# Patient Record
Sex: MALE | Race: WHITE
[De-identification: names, ages, dates, MRNs, and addresses within clinical notes are randomized per-mention and may not be internally consistent; named-entity substitution may affect disease eponyms.]

---

## 2016-12-29 NOTE — HP
Date/Time of Note


Date/Time of Note


DATE: 12/29/16 


TIME: 23:01





Assessment/Plan


VTE Prophylaxis


VTE Prophylaxis Intervention:  SCD's





Lines/Catheters


IV Catheter Type (from Nrs):  Saline Lock





Assessment/Plan


Assessment/Plan


80 yo M with 


1. Severe dysphagia 2/2 #2


2. 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


3. Inflammation and  ulceration of oral mucosa likely associated with radiation 

with likely candida superinfection


4. 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 

radiation


5. Pancytopenia with severe leucopenia likely 2/2 Chemo


6. Diffuse chronic skin rash associated with chronic itching  r/o psoriasis


7. Prev alcohol abuse


8. Partially calcified mass R jugular foramen, ?mets





PLAN:


admit med surg / optimize with IVF / nystatin swish / abx mouthwash / pain 

control / antiemetics  and frequent suctioning


GI consult for possible endoscopy + G tube placement. Patient is ok with G-tube


Neutropenic precautions / Oncology consult for pancytopenia/ patient will 

likely benefit from Neupogen therapy


Obtain records from Outpt Rad oncologist Dr Bryson re: cancer staging and 

prognosis


antipruritic / steroid cream PRN for skin itching if patient requests


CXR to eval for possible aspiration pneumonia / pneumonitis


Supportive care


Prophylaxis: IV PPI / SCDs.





Further evaluation and treatment will be based on clinical course


Full discussion with care team done. All questions Answered


Please also see orders.


Total time spent on this evaluation >35mins





HPI/ROS


Admit Date/Time


Admit Date/Time


Dec 29, 2016 at 20:57





Hx of Present Illness


PRESENTING COMPLAINT: difficulty swallowing


HISTORY OF PRESENTING COMPLAINT: 80 yo M who is currently undergoing radiation 

therapy for a parotid malignant tumor. he is s/p 3-5 cycles of chemo and is 

getting radiation every couple of weeks with Dr Bryson in [facility]. He has been 

having worsening dysphagia over the last couple of months and now it's so bad, 

he cannot even tolerate liquids without coughing. He was advised to go to the 

ER for evaluation by his doctor's office and he is being admitted for severe 

dysphagia and odynophagia for GI eval and resucitation. He was also found to be 

almost completely neutropenic and is hence a very high infection risk and will 

need in-house oncology review as well.





ROS


Constitutional:  No diaphoresis, No febrile, No nausea


ENT:  dysphagia, sore throat


Respiratory:  cough, 


   No shortness of breath, No wheezing


Cardiovascular:  No chest pain, No orthopenea, No palpitations


Gastrointestinal:  No pain, No vomiting


Genitourinary:  no complaints


Skin:  erythema, pruritis, skin lesions





PMH/Family/Social


Past Medical History


* Malignant parotid tumor


* Dyslipidemia


* BPH





Family History


Significant Family History:  no pertinent family hx





Social History


* shares an apartment with friends


Alcohol Use:  heavy (used to drink a beer daily)


Smoking Status:  Never smoker


Drug Use:  none





Exam/Review of Systems


Vital Signs


Vitals





 Vital Signs








  Date Time  Temp Pulse Resp B/P Pulse Ox O2 Delivery O2 Flow Rate FiO2


 


12/29/16 21:18 97.6 75 18 104/57 99 Room Air  











Exam


Constitutional:  alert, oriented, 


   No distress


Psych:  anxiety


Head:  No atraumatic, No normocephalic (see below)


Eyes:  PERRL, nl conjunctiva


ENMT:  No mucosa pink and moist, No nl lips & teeth (patient has very dry, 

cracked and peeling lips with hisoral mucosa ulcerated, inflammed as well as 

with whitish coating diffusely extending topost pharynx)


Neck:  supple


Respiratory:  diminished breath sounds, other (loud crackles and congested 

cough suggestive of aspiration)


Cardiovascular:  regular rate and rhythm, 


   No murmurs/extra sounds


Gastrointestinal:  bowel sounds, non-tender, soft


Genitourinary - Male:  other (deffered)


Musculoskeletal:  range of motion (grossly normal)


Extremities:  No edema


Skin:  rash or lesions (generalized occasionally erythematous and scaly macular 

rash with evidence of itching / picking )


Lymph:  enlarged (in neck bilaterally), 


   No nl lymph nodes





Labs


Result Diagram:  


12/29/16 1625                                                                  

              12/29/16 1625








Medications


Medications





 Current Medications


Sodium Chloride (NS) 1,000 ml @  80 mls/hr [id number] IV ;  Start 12/29/16 at 20:55

;  Stop 12/30/16 at 09:24


Pantoprazole 40 mg 40 mg DAILY@06 IV ;  Start 12/30/16 at 06:00;  Status UNV


Dextrose/Sodium Chloride (D5-1/2ns) 1,000 ml @  100 mls/hr Q10H IV ;  Start 12/ 29/16 at 23:00;  Status UNV


Nystatin (Nystatin Susp) 5 ml QID PO ;  Start 12/29/16 at 23:00;  Status UNV


Chlorhexidine Gluconate (Peridex) 15 ml Q12 MT ;  Start 12/30/16 at 09:00;  

Status UNV





Procedures


Procedures





 Laboratory Tests








Test


  12/29/16


16:25


 


Anion Gap 16 


 


Basophils # 10^3/ul 


 


Basophils % % 


 


Blood Morphology Comment  


 


Blood Urea Nitrogen 17mg/dl 


 


Calcium Level 9.1mg/dl 


 


Carbon Dioxide Level 29mmol/L 


 


Chloride Level 97mmol/L 


 


Creatinine 0.70mg/dl 


 


Eosinophils # 10^3/ul 


 


Eosinophils % % 


 


Glucose Level 151mg/dl 


 


Hematocrit 31.9% 


 


Hemoglobin 10.6g/dl 


 


Lymphocytes # 0.210^3/ul 


 


Lymphocytes % 54.0% 


 


Mean Corpuscular Hemoglobin 28.6pg 


 


Mean Corpuscular Hemoglobin


Concent 33.2g/dl 


 


 


Mean Corpuscular Volume 86.3fl 


 


Mean Platelet Volume 8.4fl 


 


Monocytes # 0.110^3/ul 


 


Monocytes % 36.0% 


 


Neutrophils # 0.010^3/ul 


 


Neutrophils % 10.0% 


 


Platelet Count 18736^3/UL 


 


Platelet Estimate


  PLT APPEAR


DECREASED


 


Potassium Level 4.1mmol/L 


 


Red Blood Count 3.7010^6/ul 


 


Red Cell Distribution Width 23.0% 


 


Sodium Level 138mmol/L 


 


White Blood Count 0.410^3/ul 








________________________________________________________________________________

____________________________________________


PROCEDURE:   CT soft tissue neck.  12/19/16


 


CLINICAL INDICATION:   Pain status post radiation to the neck and difficulty 

swallowing 


 


TECHNIQUE:   The study was performed utilizing a [employer] 64-slice multidetector CT 

scanner. Direct thin section helically acquired axial sections were obtained 

through the neck without with the use of 80 cc of a 300.  Coronal and sagittal 

reformations were obtained. The images were reviewed on a PACS workstation. The 

total CTDIvol is 9.57 mGy and the DLP is 227.26 mGy-cm.


 


COMPARISON:   None available 


 


FINDINGS:


 


The visualized posterior fossa is remarkable for the appearance of a partially 

calcified mass in the region of the right jugular fossa which measures 

approximately 1.7 cm transverse by 8 mm in AP dimensions.  Additional imaging 

with contrast MRI brain is suggested.  The remainder of the posterior fossa and 

skull base are normal.


 


The visualized nasopharynx, oropharynx and oral cavity are remarkable for edema 

within the pharyngeal mucosal spaces of the oropharynx. Edema is present of the 

visualized epiglottis.  This is compatible with radiation changes.  The 

intrinsic muscles of the tongue and the pre-epiglottic space and vallecula are 

normal and symmetric. The bilateral sublingual glands, submandibular glands and 

the right parotid gland are normal.  


 


The presence of a soft tissue mass noted involving the left deep parotid gland 

as well as in the sternocleidomastoid muscle and the interposing fat. Recommend 

correlation with the patient's primary pathology which is not available to me 

at this time.  Matted pathologic lymphadenopathy versus contiguous spread of 

neoplasm or the diagnostic considerations.  This mass measures approximately 

3.3 cm AP by 2.9 cm in transverse dimensions by 5.7 cm in superior inferior 

dimensions.  Mild extrinsic deformity is noted on the left jugular vein.  

Internal and external carotid arteries.  Thrombus or partial stenosis of the 

distal right internal jugular vein is noted.


 


The remainder of the supra glottis, glottis subglottic neck is normal.  The 

bilateral thyroid lobes and lung apices are clear. The visualized cervical 

esophagus appears normal.


 


Other than the above described left deep parotid and sternocleidomastoid mass 

no other evidence for pathologic lymphadenopathy is present.


 


IMPRESSION:


1.  No evidence for airway or esophageal obstruction. Recommend additional 

imaging to further evaluate dysphasia such as CT chest with contrast or barium 

swallow.


2.  Left deep parotid and sternocleidomastoid mass measuring approximately 3.3 

cm AP by 2.9 cm transverse by 5.7 cm in superior inferior dimensions.  

Differential to include contiguous spread of neoplasm from the left deep 

parotid lobe or pathologic lymphadenopathy.


3.  Right jugular foramen partially calcified mass measuring 8 mm AP by 1.7 cm 

in transverse dimensions.  Recommend additional imaging to include posterior 

fossa /IAC MRI brain MRI with contrast.


4.  Chronic paranasal sinus disease.


5.  Radiation changes in the oral pharyngeal mucosa.











TIM MACK. Dec 29, 2016 23:16

## 2016-12-29 NOTE — RADRPT
PROCEDURE:   CT soft tissue neck. 

 

CLINICAL INDICATION:   Pain status post radiation to the neck and difficulty swallowing 

 

TECHNIQUE:   The study was performed utilizing a GE 64-slice multidetector CT scanner. Direct thin s
ection helically acquired axial sections were obtained through the neck without with the use of 80 c
c of a 300.  Coronal and sagittal reformations were obtained. The images were reviewed on a PACS wor
kstation. The total CTDIvol is 9.57 mGy and the DLP is 227.26 mGy-cm.

 

COMPARISON:   None available 

 

FINDINGS:

 

The visualized posterior fossa is remarkable for the appearance of a partially calcified mass in the
 region of the right jugular fossa which measures approximately 1.7 cm transverse by 8 mm in AP dime
nsions.  Additional imaging with contrast MRI brain is suggested.  The remainder of the posterior fo
ssa and skull base are normal.

 

The visualized nasopharynx, oropharynx and oral cavity are remarkable for edema within the pharyngea
l mucosal spaces of the oropharynx. Edema is present of the visualized epiglottis.  This is compatib
le with radiation changes.  The intrinsic muscles of the tongue and the pre-epiglottic space and chary
lecula are normal and symmetric. The bilateral sublingual glands, submandibular glands and the right
 parotid gland are normal.  

 

The presence of a soft tissue mass noted involving the left deep parotid gland as well as in the javon
rnocleidomastoid muscle and the interposing fat. Recommend correlation with the patient's primary pa
thology which is not available to me at this time.  Matted pathologic lymphadenopathy versus contigu
ous spread of neoplasm or the diagnostic considerations.  This mass measures approximately 3.3 cm AP
 by 2.9 cm in transverse dimensions by 5.7 cm in superior inferior dimensions.  Mild extrinsic defor
mity is noted on the left jugular vein.  Internal and external carotid arteries.  Thrombus or partia
l stenosis of the distal right internal jugular vein is noted.

 

The remainder of the supra glottis, glottis subglottic neck is normal.  The bilateral thyroid lobes 
and lung apices are clear. The visualized cervical esophagus appears normal.

 

Other than the above described left deep parotid and sternocleidomastoid mass no other evidence for 
pathologic lymphadenopathy is present.

 

IMPRESSION:

1.  No evidence for airway or esophageal obstruction. Recommend additional imaging to further evalua
te dysphasia such as CT chest with contrast or barium swallow.

2.  Left deep parotid and sternocleidomastoid mass measuring approximately 3.3 cm AP by 2.9 cm trans
verse by 5.7 cm in superior inferior dimensions.  Differential to include contiguous spread of neopl
asm from the left deep parotid lobe or pathologic lymphadenopathy.

3.  Right jugular foramen partially calcified mass measuring 8 mm AP by 1.7 cm in transverse dimensi
ons.  Recommend additional imaging to include posterior fossa /IAC MRI brain MRI with contrast.

4.  Chronic paranasal sinus disease.

5.  Radiation changes in the oral pharyngeal mucosa.

 

A call report was made to LEXI MEI at 12/29/2016 6:12:44 PM following the completion of t
he examination by the undersigned.

 

 

 

RPTAT: HDC

_____________________________________________ 

.Alaina Greenfield MD, MD           Date    Time 

Electronically viewed and signed by .Alaina Greenfield MD, MD on 12/29/2016 18:19 

 

D:  12/29/2016 18:19  T:  12/29/2016 18:19

.C/

## 2016-12-29 NOTE — ERA
ER Documentation


Chief Complaint


Date/Time


DATE: 12/29/16 


TIME: 21:03


Chief Complaint


unable to swallow x 3 days,neck pain,pt has neck CA





HPI


This is 73 yo male with a history of neck cancer.  The patient is unsure what 

kind of cancer he has.  He is suspecting it was just parotid gland.  The 

patient says he underwent extensive radiation treatments at a cancer clinic but 

does not know the name of the clinic over the past couple of months.  The 

patient is homeless and says that the past couple days he is completely unable 

to tolerate liquids or solids.  He says he tries to drink water he will spit 

right back up.  The patient says he is urinating but less.  Denies any cough 

fever chest pain abdominal pain nausea vomiting or diarrhea





ROS


All systems reviewed and are negative except as per history of present illness.





Medications


Home Meds


Reported Medications


Atorvastatin Calcium* (Atorvastatin Calcium*) 20 Mg Tablet, 20 MG PO DAILY, #30 

TAB


   12/29/16


Tamsulosin Hcl* (Tamsulosin Hcl*) 0.4 Mg Cap.er.24h, 0.4 MG PO DAILY, CAP


   12/29/16





Allergies


Allergies:  


Coded Allergies:  


     No Known Allergy (Unverified , 12/29/16)





PMhx/Soc


Medical and Surgical Hx:  pt denies Medical Hx, pt denies Surgical Hx


Hx Alcohol Use:  No


Hx Tobacco Use:  No


Smoking Status:  Unknown if ever smoked





FmHx


Family History:  No coronary disease





Physical Exam


Vitals





Vital Signs








  Date Time  Temp Pulse Resp B/P Pulse Ox O2 Delivery O2 Flow Rate FiO2


 


12/29/16 15:23 98.9 108 18 122/67 98   








Physical Exam


Const:      Well-developed, well-nourished


 Head:        Atraumatic, normocephalic


 Eyes:       Normal Conjunctiva, PERRLA, EOMI, normal sclera, no nystagmus


 ENT:         Normal External Ears, Nose and Mouth, moist mucus membranes, 

there is some mild diffuse thrush in the mouth with some erythema to the palate 

and tonsillar pillars no exudate.


 Neck:        Full range of motion.  No meningismus, no lymphadenopathy.


 Resp:         Clear to auscultation bilaterally, no wheezing, rhonchi, rales


 Cardio:       Regular rate and rhythm, no murmurs, S1 S2 present


 Abd:         Soft, non tender x 4, non distended. Normal bowel sounds, no 

guarding or rebound, no pulsitile abdominal masses or bruits


 Skin:         No petechiae or rashes, no ecchymosis , no maculopapular rash


 Back:        No midline or flank tenderness


 Ext:          No cyanosis, or edema, FROM x 4, normal inspection, 

neurovascularly intact x 4


 Neur:        Awake and alert, STR 5/5 x 4, sensation intact x 4, no focal 

findings, cerebellum intact


 Psych:        Normal Mood and Affect


Result Diagram:  


12/29/16 1625                                                                  

              12/29/16 1625





Results 24 hrs





 Laboratory Tests








Test


  12/29/16


16:25


 


Anion Gap 16 


 


Basophils # 10^3/ul 


 


Basophils % % 


 


Blood Morphology Comment  


 


Blood Urea Nitrogen 17mg/dl 


 


Calcium Level 9.1mg/dl 


 


Carbon Dioxide Level 29mmol/L 


 


Chloride Level 97mmol/L 


 


Creatinine 0.70mg/dl 


 


Eosinophils # 10^3/ul 


 


Eosinophils % % 


 


Glucose Level 151mg/dl 


 


Hematocrit 31.9% 


 


Hemoglobin 10.6g/dl 


 


Lymphocytes # 0.210^3/ul 


 


Lymphocytes % 54.0% 


 


Mean Corpuscular Hemoglobin 28.6pg 


 


Mean Corpuscular Hemoglobin


Concent 33.2g/dl 


 


 


Mean Corpuscular Volume 86.3fl 


 


Mean Platelet Volume 8.4fl 


 


Monocytes # 0.110^3/ul 


 


Monocytes % 36.0% 


 


Neutrophils # 0.010^3/ul 


 


Neutrophils % 10.0% 


 


Platelet Count 14032^3/UL 


 


Platelet Estimate


  PLT APPEAR


DECREASED


 


Potassium Level 4.1mmol/L 


 


Red Blood Count 3.7010^6/ul 


 


Red Cell Distribution Width 23.0% 


 


Sodium Level 138mmol/L 


 


White Blood Count 0.410^3/ul 








 Current Medications








 Medications


  (Trade)  Dose


 Ordered  Sig/Rosey


 Route


 PRN Reason  Start Time


 Stop Time Status Last Admin


Dose Admin


 


 Sodium Chloride


  (NS)  1,000 ml @ 


 1,000 mls/hr  Q1H STAT


 IV


   12/29/16 16:14


 12/29/16 17:13 DC 12/29/16 16:14


 


 


 IV Flush 10 ml  10 ml  STK-MED ONCE


 .ROUTE


   12/29/16 17:17


 12/29/16 17:18 DC 12/29/16 17:26


 


 


 Sodium Chloride


  (NS)  100 ml @ ud  STK-MED ONCE


 .ROUTE


   12/29/16 17:17


 12/29/16 17:18 DC 12/29/16 17:26


 


 


 Iohexol 150 ml  150 ml  STK-MED ONCE


 .ROUTE


   12/29/16 17:17


 12/29/16 17:18 DC 12/29/16 17:26


 


 


 Sodium Chloride


  (NS)  1,000 ml @ 


 80 mls/hr  [id number]


 IV


   12/29/16 20:55


 12/30/16 09:24   


 


 


 Ondansetron HCl


  (Zofran Inj)  4 mg  BRIDGE ORDER PRN


 IV


 NAUSEA AND/OR VOMITING  12/29/16 21:00


 12/30/16 20:59   


 


 


 Acetaminophen


  (Tylenol Tab)  650 mg  ER BRIDGE PRN


 PO


 MILD PAIN/FEVER  12/29/16 21:00


 12/30/16 20:59   


 











Procedures/MDM


PROCEDURE:   CT soft tissue neck. 


 


CLINICAL INDICATION:   Pain status post radiation to the neck and difficulty 

swallowing 


 


TECHNIQUE:   The study was performed utilizing a [employer] 64-slice multidetector CT 

scanner. Direct thin section helically acquired axial sections were obtained 

through the neck without with the use of 80 cc of a 300.  Coronal and sagittal 

reformations were obtained. The images were reviewed on a PACS workstation. The 

total CTDIvol is 9.57 mGy and the DLP is 227.26 mGy-cm.


 


COMPARISON:   None available 


 


FINDINGS:


 


The visualized posterior fossa is remarkable for the appearance of a partially 

calcified mass in the region of the right jugular fossa which measures 

approximately 1.7 cm transverse by 8 mm in AP dimensions.  Additional imaging 

with contrast MRI brain is suggested.  The remainder of the posterior fossa and 

skull base are normal.


 


The visualized nasopharynx, oropharynx and oral cavity are remarkable for edema 

within the pharyngeal mucosal spaces of the oropharynx. Edema is present of the 

visualized epiglottis.  This is compatible with radiation changes.  The 

intrinsic muscles of the tongue and the pre-epiglottic space and vallecula are 

normal and symmetric. The bilateral sublingual glands, submandibular glands and 

the right parotid gland are normal.  


 


The presence of a soft tissue mass noted involving the left deep parotid gland 

as well as in the sternocleidomastoid muscle and the interposing fat. Recommend 

correlation with the patient's primary pathology which is not available to me 

at this time.  Matted pathologic lymphadenopathy versus contiguous spread of 

neoplasm or the diagnostic considerations.  This mass measures approximately 

3.3 cm AP by 2.9 cm in transverse dimensions by 5.7 cm in superior inferior 

dimensions.  Mild extrinsic deformity is noted on the left jugular vein.  

Internal and external carotid arteries.  Thrombus or partial stenosis of the 

distal right internal jugular vein is noted.


 


The remainder of the supra glottis, glottis subglottic neck is normal.  The 

bilateral thyroid lobes and lung apices are clear. The visualized cervical 

esophagus appears normal.


 


Other than the above described left deep parotid and sternocleidomastoid mass 

no other evidence for pathologic lymphadenopathy is present.


 


IMPRESSION:


1.  No evidence for airway or esophageal obstruction. Recommend additional 

imaging to further evaluate dysphasia such as CT chest with contrast or barium 

swallow.


2.  Left deep parotid and sternocleidomastoid mass measuring approximately 3.3 

cm AP by 2.9 cm transverse by 5.7 cm in superior inferior dimensions.  

Differential to include contiguous spread of neoplasm from the left deep 

parotid lobe or pathologic lymphadenopathy.


3.  Right jugular foramen partially calcified mass measuring 8 mm AP by 1.7 cm 

in transverse dimensions.  Recommend additional imaging to include posterior 

fossa /IAC MRI brain MRI with contrast.


4.  Chronic paranasal sinus disease.


5.  Radiation changes in the oral pharyngeal mucosa.


 


A call report was made to LEXI MEI at 12/29/2016 6:12:44 PM 

following the completion of the examination by the undersigned.


 


 


 


RPTAT: HDC


_____________________________________________ 


.Alaina Greenfield MD, MD           Date    Time 


Electronically viewed and signed by .Alaina Greenfield MD, MD on 12/29/2016 18:

19 


 


D:  12/29/2016 18:19  T:  12/29/2016 18:19


.C/





CC: MONIKA ELLIOTT DO














I gave a p.o. challenge to the patient with a glass of water.  He is able to 

tolerate to sips.  The water comes spewing backup








I will admit the patient due to severe neutropenia with an absolute neutrophil 

count of 0 and a white blood count of 0.4.


I am suspecting severe radiation esophagitis


He is also unable to tolerate anything orally he may likely need a feeding tube 

placed





Discussed the case with panel Dr. David Choudhary


Diagnosis:  


 Primary Impression:  


 Neutropenia


 Qualified Code:  D70.9 - Neutropenia, unspecified type


 Additional Impression:  


 Radiation esophagitis


Condition:  Stable











MONIKA ELLIOTT DO Dec 29, 2016 21:07

## 2016-12-30 NOTE — CONS
Date/Time of Note


Date/Time of Note


DATE: 12/30/16 


TIME: 08:57





Assessment/Plan


Assessment/Plan


Additional Assessment/Plan


Assessment:


* Dysphagia secondary to radiation


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


* Parotid cancer


* History of alcohol abuse








Plan:


* EGD + PEG





Consultation Date/Type/Reason


Admit Date/Time


Dec 29, 2016 at 20:57


Type of Consultation:  Gastroenterology


Reason for Consultation


Dysphagia





Hx of Present Illness


80 YO M presented with problems swallowing food and liquids for the last 2 to 3 

days. Pt currently undergoing treatment for parotid CA with radiation. Pt 

unable to provide concise history of diagnosis and treatment. Pt report 

radiation x 3 weeks and unknown duration of chemotherapy before that. Pt denies 

nausea, vomiting, abdominal pain, and diarrhea.  Advised patient of risks/

benefits/


ENT:  dysphagia, sore throat


Respiratory:  cough, 


   No shortness of breath, No wheezing


Cardiovascular:  No chest pain, No orthopenea, No palpitations


Gastrointestinal:  No pain, No vomiting


Genitourinary:  no complaints


Skin:  erythema, pruritis, skin lesions


Psychological:  anxiety





Past Medical History


Medical History:  other (Radiation therapy for parotid cancer)





Family History


Significant Family History:  no pertinent family hx





Social History


Alcohol Use:  heavy (used to drink a beer daily)


Smoking Status:  Never smoker


Drug Use:  none





Exam/Review of Systems


Vital Signs


Vitals





 Vital Signs








  Date Time  Temp Pulse Resp B/P Pulse Ox O2 Delivery O2 Flow Rate FiO2


 


12/29/16 22:00 98.2 74 17 112/62 99 Room Air  














 Intake and Output   


 


 12/29/16 12/29/16 12/30/16





 15:00 23:00 07:00


 


Output Total  400 ml 


 


Balance  -400 ml 











Exam


Constitutional:  alert, oriented, well developed


Psych:  nl mood/affect, no complaints


Head:  atraumatic, normocephalic


Eyes:  EOMI, PERRL, nl conjunctiva, nl lids, nl sclera


ENMT:  nl external ears & nose, nl lips & teeth, nl nasal mucosa & septum


Neck:  non-tender, other (Radiation injury), supple


Respiratory:  clear to auscultation, normal air movement


Cardiovascular:  nl pulses, regular rate and rhythm


Gastrointestinal:  nl liver, spleen, non-tender, soft


Musculoskeletal:  nl extremities to inspection, nl gait and stance


Extremities:  normal pulses


Neurological:  CNS II-XII intact, nl mental status, nl speech, nl strength


Skin:  nl turgor, 


   No rash or lesions


Lymph:  nl lymph nodes





Results


Result Diagram:  


12/30/16 0429 12/30/16 0429





Results 24 hrs





Laboratory Tests








Test


  12/29/16


16:25 12/30/16


04:29


 


Anion Gap 16   16  


 


Basophils #      


 


Basophils %      


 


Blood Morphology Comment      


 


Blood Urea Nitrogen 17   14  


 


Calcium Level 9.1   8.9  


 


Carbon Dioxide Level 29   30  


 


Chloride Level 97   98  


 


Creatinine 0.70   0.53  L


 


Eosinophils #      


 


Eosinophils %      


 


Glucose Level 151   168  


 


Hematocrit 31.9  L 31.3  L


 


Hemoglobin 10.6  L 10.3  L


 


Lymphocytes # 0.2  L   


 


Lymphocytes % 54.0  H 


 


Mean Corpuscular Hemoglobin 28.6  L 28.6  L


 


Mean Corpuscular Hemoglobin


Concent 33.2  


  33.0  


 


 


Mean Corpuscular Volume 86.3   86.5  


 


Mean Platelet Volume 8.4   8.6  


 


Monocytes # 0.1  L   


 


Monocytes % 36.0  H 


 


Neutrophils # 0.0  L   


 


Neutrophils % 10.0  L   


 


Platelet Count 131  L 133  L


 


Platelet Estimate


  PLT APPEAR


DECREASED 


 


 


Potassium Level 4.1   3.7  


 


Red Blood Count 3.70  L 3.61  L


 


Red Cell Distribution Width 23.0  H 22.6  H


 


Sodium Level 138   140  


 


White Blood Count 0.4  L 0.5  #L


 


Alanine Aminotransferase


(ALT/SGPT) 


  36  


 


 


Albumin  3.3  


 


Alkaline Phosphatase  57  


 


Aspartate Amino Transf


(AST/SGOT) 


  23  


 


 


Direct Bilirubin  0.00  


 


Indirect Bilirubin  1.2  H


 


Magnesium Level  1.7  


 


Phosphorus Level  3.1  


 


Total Bilirubin  1.2  


 


Total Protein  6.8  











Medications


Medications





 Current Medications


Sodium Chloride (NS) 1,000 ml @  80 mls/hr [id number] IV ;  Start 12/29/16 at 20:55

;  Stop 12/30/16 at 09:24


Pantoprazole 40 mg 40 mg DAILY@06 IV  Last administered on 12/30/16at 06:45; 

Admin Dose 40 MG;  Start 12/30/16 at 06:00


Dextrose/Sodium Chloride (D5-1/2ns) 1,000 ml @  100 mls/hr Q10H IV  Last 

administered on 12/30/16at 08:27; Admin Dose 100 MLS/HR;  Start 12/29/16 at 23:

00


Nystatin (Nystatin Susp) 5 ml QID PO  Last administered on 12/30/16at 08:26; 

Admin Dose 5 ML;  Start 12/29/16 at 23:00


Chlorhexidine Gluconate (Peridex) 15 ml Q12 MT  Last administered on 12/30/16at 

08:26; Admin Dose 15 ML;  Start 12/30/16 at 09:00


Ondansetron HCl (Zofran Inj) 4 mg Q6H  PRN IV NAUSEA AND/OR VOMITING;  Start 12/ 29/16 at 23:30











BASILIA ASTORGA MD Dec 30, 2016 09:01

## 2016-12-30 NOTE — CONS
Date/Time of Note


Date/Time of Note


DATE: 12/30/16 


TIME: 11:40





Assessment/Plan


Assessment/Plan


Chief Complaint/Hosp Course


The patient is a 81 year old male who is currently undergoing radiation therapy 

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  He received 

neoadjuvant carbo/taxol with good response x 3 cycles, starting in September 2016 by Dr. Baker, and has now received 4 weeks of radiation therapy, last 

12/27/16 with Dr. Eugenio Bryson, along with continued platinum/taxane 

chemotherapy, last 2 weeks ago per Dr. Baker. He has been having worsening 

dysphagia over the last couple of months and now it's so severe that he cannot 

even tolerate liquids without coughing. He was advised to go to the ER for 

evaluation by Dr. Bryson's covering partner, Dr. Rica Aguirre, for dehydration

, and was admitted for severe dysphagia and found to have neutropenic fever.





- Neutropenia related to chemotherapy, last approximately 2 weeks ago per Dr. Baker.  It is unlikely to be related to radiation to the head and neck 

region.


- BCx x 2 and urine cultures ordered, CXR ordered.  Cefepime ordered 2 gm IV Q8 

hours, first dose now.  Would also consider ID consult if patient does not 

improve.


- Neupogen 300 mg daily ordered, continue until ANC > 1000 for at least 2 days


- Transfuse Hgb < 8, plt < 10 or if bleeding


- Agree with G tube for severe dysphagia


- Continue nystatin swish and pain control per primary team


- Would recommend social work/case mangement involvement for social issues (per 

Dr. Aguirre, patient lives in car with cat, however patient states that he 

lives with a friend)


Problems:  





Consultation Date/Type/Reason


Admit Date/Time


Dec 29, 2016 at 20:57


Date of Consultation:  Dec 30, 2016


Type of Consultation:  Hematology/Oncology





Hx of Present Illness


The patient is a 81 year old male who is currently undergoing radiation therapy 

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  He received 

neoadjuvant carbo/taxol with good response x 3 cycles, starting in September 2016 by Dr. Baker, and has now received 4 weeks of radiation therapy, last 

12/27/16 with Dr. Eugenio Bryson. He has been having worsening dysphagia over the 

last couple of months and now it's so severe that he cannot even tolerate 

liquids without coughing. He was advised to go to the ER for evaluation by Dr. Bryson's covering partner, Dr. Rica Aguirre, for dehydration.





He was admitted for severe dysphagia and odynophagia for GI eval.  He then 

developed neutropenic fever; cultures ordered, cefepime ordered.  It does not 

appear that he has had las drawn during radiation and per Dr. Aguirre is 

unlikely to be neutropenic from radiation.  He has candidiasis and also needs 

liquid anagelsics.  He states that he lives in [place] with a friend.





The patient complaints of difficulty swallowing and pain in his throat.


ENT:  dysphagia, sore throat


Respiratory:  cough, 


   No shortness of breath, No wheezing


Cardiovascular:  No chest pain, No orthopenea, No palpitations


Gastrointestinal:  No pain, No vomiting


Genitourinary:  no complaints


Skin:  erythema, pruritis, skin lesions


Psychological:  anxiety





Past Medical History


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


Dyslipidemia


BPH





Family History


Significant Family History:  no pertinent family hx





Social History


Alcohol Use:  heavy (used to drink a beer daily)


Smoking Status:  Never smoker


Drug Use:  none





Exam/Review of Systems


Vital Signs


Vitals





 Vital Signs








  Date Time  Temp Pulse Resp B/P Pulse Ox O2 Delivery O2 Flow Rate FiO2


 


12/30/16 10:00 98.7       


 


12/30/16 08:00  95 18 116/56 98 Room Air  














 Intake and Output   


 


 12/29/16 12/29/16 12/30/16





 15:00 23:00 07:00


 


Output Total  400 ml 


 


Balance  -400 ml 











Exam


Constitutional:  alert, oriented, no distress, frail appearing


Head:  No atraumatic, No normocephalic 


HEENT   PERRL, nl conjunctiva, +dry, cracked and peeling lips with ulcerated 

and inflamed oral mucosa as well as with whitish coating diffusely extending to 

posterior pharynx


Respiratory:  + crackles, diminished breath sounds


Cardiovascular:  regular rate and rhythm, 


Gastrointestinal:  bowel sounds, non-tender, soft


Extremities:  No edema


Skin:  eythematous scaly rash or lesions 


Lymph:  enlarged





Results


CT soft tissue neck 12/29/16


IMPRESSION:


1.  No evidence for airway or esophageal obstruction. Recommend additional 

imaging to further evaluate dysphasia such as CT chest with contrast or barium 

swallow.


2.  Left deep parotid and sternocleidomastoid mass measuring approximately 3.3 

cm AP by 2.9 cm transverse by 5.7 cm in superior inferior dimensions.  

Differential to include contiguous spread of neoplasm from the left deep 

parotid lobe or pathologic lymphadenopathy.


3.  Right jugular foramen partially calcified mass measuring 8 mm AP by 1.7 cm 

in transverse dimensions.  Recommend additional imaging to include posterior 

fossa /IAC MRI brain MRI with contrast.


4.  Chronic paranasal sinus disease.


5.  Radiation changes in the oral pharyngeal mucosa.


Result Diagram:  


12/30/16 0429                                                                  

              12/30/16 0429





Results 24 hrs





Laboratory Tests








Test


  12/29/16


16:25 12/30/16


04:29 12/30/16


10:10


 


Anion Gap 16   16   


 


Basophils #       


 


Basophils %       


 


Blood Morphology Comment       


 


Blood Urea Nitrogen 17   14   


 


Calcium Level 9.1   8.9   


 


Carbon Dioxide Level 29   30   


 


Chloride Level 97   98   


 


Creatinine 0.70   0.53  L 


 


Eosinophils #       


 


Eosinophils %       


 


Glucose Level 151   168   


 


Hematocrit 31.9  L 31.3  L 


 


Hemoglobin 10.6  L 10.3  L 


 


Lymphocytes # 0.2  L 0.4  L 


 


Lymphocytes % 54.0  H 77.0  H 


 


Mean Corpuscular Hemoglobin 28.6  L 28.6  L 


 


Mean Corpuscular Hemoglobin


Concent 33.2  


  33.0  


  


 


 


Mean Corpuscular Volume 86.3   86.5   


 


Mean Platelet Volume 8.4   8.6   


 


Monocytes # 0.1  L 0.1  L 


 


Monocytes % 36.0  H 20.0  H 


 


Neutrophils # 0.0  L 0.0  L 


 


Neutrophils % 10.0  L 3.0  L 


 


Platelet Count 131  L 133  L 


 


Platelet Estimate


  PLT APPEAR


DECREASED 


  


 


 


Potassium Level 4.1   3.7   


 


Red Blood Count 3.70  L 3.61  L 


 


Red Cell Distribution Width 23.0  H 22.6  H 


 


Sodium Level 138   140   


 


White Blood Count 0.4  L 0.5  #L 


 


Alanine Aminotransferase


(ALT/SGPT) 


  36  


  


 


 


Albumin  3.3   


 


Alkaline Phosphatase  57   


 


Aspartate Amino Transf


(AST/SGOT) 


  23  


  


 


 


Direct Bilirubin  0.00   


 


Indirect Bilirubin  1.2  H 


 


Magnesium Level  1.7   


 


Phosphorus Level  3.1   


 


Total Bilirubin  1.2   


 


Total Protein  6.8   


 


INR International Normalized


Ratio 


  


  1.25  


 


 


Prothrombin Time   15.8  H


 


Prothrombin Time Ratio   1.2  











Medications


Medications





 Current Medications


Dextrose/Sodium Chloride (D5-1/2ns) 1,000 ml @  100 mls/hr Q10H IV  Last 

administered on 12/30/16at 08:27; Admin Dose 100 MLS/HR;  Start 12/29/16 at 23:

00


Nystatin (Nystatin Susp) 5 ml QID PO  Last administered on 12/30/16at 08:26; 

Admin Dose 5 ML;  Start 12/29/16 at 23:00


Chlorhexidine Gluconate (Peridex) 15 ml Q12 MT  Last administered on 12/30/16at 

08:26; Admin Dose 15 ML;  Start 12/30/16 at 09:00


Ondansetron HCl (Zofran Inj) 4 mg Q6H  PRN IV NAUSEA AND/OR VOMITING;  Start 12/ 29/16 at 23:30


Pantoprazole 40 mg 40 mg BID@06,18 IV ;  Start 12/30/16 at 18:00


Cefepime HCl (Maxipime 2gm/50 ml (Pmx)) 50 ml @  100 mls/hr Q8 IVPB ;  Start 12/ 30/16 at 14:00











NASH MCRAE MD Dec 30, 2016 11:50

## 2016-12-30 NOTE — RADRPT
PROCEDURE:   XR Chest. 

 

CLINICAL INDICATION:   Fever  

 

TECHNIQUE:   Chest AP portable. 

 

COMPARISON:   No comparison available. 

 

FINDINGS:

 

The mediastinal structures are unremarkable.  There is calcification of the thoracic aorta (consiste
nt with atherosclerosis). The heart is normal in size and configuration. The pulmonary vascularity i
s normal. There is a mild LLL patchy consolidation.  The pleural spaces are unremarkable.  There are
 senescent changes of the axial skeleton.   

 

IMPRESSION:

 

Mild LLL patchy consolidation.    

  

 

RPTAT: [facility]

_____________________________________________ 

.Chris Vitale MD, MD           Date    Time 

Electronically viewed and signed by .Chris Vitale MD, MD on 12/30/2016 11:13 

 

D:  12/30/2016 11:13  T:  12/30/2016 11:13

.B/

## 2016-12-30 NOTE — PN
Date/Time of Note


Date/Time of Note


DATE: 12/30/16 


TIME: 08:49





Assessment/Plan


VTE Prophylaxis


VTE Prophylaxis Intervention:  SCD's





Lines/Catheters


IV Catheter Type (from Nrsg):  Saline Lock





Assessment/Plan


Assessment/Plan





1. Severe Dysphagia 2/2 #2


2. 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


3. Inflammation and  ulceration of oral mucosa likely associated with radiation 

with likely candida superinfection


4. 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 

radiation


5. Pancytopenia with severe leucopenia likely 2/2 Chemo


6. Diffuse chronic skin rash associated with chronic itching  r/o psoriasis


7. Prev alcohol abuse


8. Partially calcified mass R jugular foramen, ?mets





PLAN:


Cont IVF / nystatin swish / abx mouthwash / pain control / antiemetics  and 

frequent suctioning


GI consult for possible endoscopy + G tube placement. Patient is ok with G-tube


Neutropenic precautions / Oncology consult for pancytopenia/ patient will 

likely benefit from Neupogen therapy


Obtain records from Outpt Rad oncologist Dr Bryson re: cancer staging and 

prognosis


antipruritic / steroid cream PRN for skin itching if patient requests


CXR to eval for possible aspiration pneumonia / pneumonitis


Supportive care





Prophylaxis: IV PPI / SCDs.





Subjective


24 Hr Interval Summary


Free Text/Dictation


c/o dysphagia and cough





Exam/Review of Systems


Vital Signs


Vitals





 Vital Signs








  Date Time  Temp Pulse Resp B/P Pulse Ox O2 Delivery O2 Flow Rate FiO2


 


12/29/16 22:00 98.2 74 17 112/62 99 Room Air  














 Intake and Output   


 


 12/29/16 12/29/16 12/30/16





 15:00 23:00 07:00


 


Output Total  400 ml 


 


Balance  -400 ml 











Exam


Constitutional:  alert, oriented, No distress


Head:  No atraumatic, No normocephalic 


HEENT   PERRL, nl conjunctiva, +dry, cracked and peeling lips with ulcerated 

and inflamed oral mucosa as well as with whitish coating diffusely extending to 

posterior pharynx


Respiratory:  + crackles, diminished breath sounds


Cardiovascular:  regular rate and rhythm, 


Gastrointestinal:  bowel sounds, non-tender, soft


Extremities:  No edema


Skin:  eythematous scaly rash or lesions 


Lymph:  enlarged





Results


Result Diagram:  


12/30/16 0429                                                                  

              12/30/16 0429





Results 24 hrs





Laboratory Tests








Test


  12/29/16


16:25 12/30/16


04:29


 


Anion Gap 16   16  


 


Basophils #      


 


Basophils %      


 


Blood Morphology Comment      


 


Blood Urea Nitrogen 17   14  


 


Calcium Level 9.1   8.9  


 


Carbon Dioxide Level 29   30  


 


Chloride Level 97   98  


 


Creatinine 0.70   0.53  L


 


Eosinophils #      


 


Eosinophils %      


 


Glucose Level 151   168  


 


Hematocrit 31.9  L 31.3  L


 


Hemoglobin 10.6  L 10.3  L


 


Lymphocytes # 0.2  L   


 


Lymphocytes % 54.0  H 


 


Mean Corpuscular Hemoglobin 28.6  L 28.6  L


 


Mean Corpuscular Hemoglobin


Concent 33.2  


  33.0  


 


 


Mean Corpuscular Volume 86.3   86.5  


 


Mean Platelet Volume 8.4   8.6  


 


Monocytes # 0.1  L   


 


Monocytes % 36.0  H 


 


Neutrophils # 0.0  L   


 


Neutrophils % 10.0  L   


 


Platelet Count 131  L 133  L


 


Platelet Estimate


  PLT APPEAR


DECREASED 


 


 


Potassium Level 4.1   3.7  


 


Red Blood Count 3.70  L 3.61  L


 


Red Cell Distribution Width 23.0  H 22.6  H


 


Sodium Level 138   140  


 


White Blood Count 0.4  L 0.5  #L


 


Alanine Aminotransferase


(ALT/SGPT) 


  36  


 


 


Albumin  3.3  


 


Alkaline Phosphatase  57  


 


Aspartate Amino Transf


(AST/SGOT) 


  23  


 


 


Direct Bilirubin  0.00  


 


Indirect Bilirubin  1.2  H


 


Magnesium Level  1.7  


 


Phosphorus Level  3.1  


 


Total Bilirubin  1.2  


 


Total Protein  6.8  











Medications


Medications





 Current Medications


Sodium Chloride (NS) 1,000 ml @  80 mls/hr [id number] IV ;  Start 12/29/16 at 20:55

;  Stop 12/30/16 at 09:24


Pantoprazole 40 mg 40 mg DAILY@06 IV  Last administered on 12/30/16at 06:45; 

Admin Dose 40 MG;  Start 12/30/16 at 06:00


Dextrose/Sodium Chloride (D5-1/2ns) 1,000 ml @  100 mls/hr Q10H IV  Last 

administered on 12/30/16at 08:27; Admin Dose 100 MLS/HR;  Start 12/29/16 at 23:

00


Nystatin (Nystatin Susp) 5 ml QID PO  Last administered on 12/30/16at 08:26; 

Admin Dose 5 ML;  Start 12/29/16 at 23:00


Chlorhexidine Gluconate (Peridex) 15 ml Q12 MT  Last administered on 12/30/16at 

08:26; Admin Dose 15 ML;  Start 12/30/16 at 09:00


Ondansetron HCl (Zofran Inj) 4 mg Q6H  PRN IV NAUSEA AND/OR VOMITING;  Start 12/ 29/16 at 23:30











JARVIS PHELPS MD Dec 30, 2016 08:59

## 2016-12-31 NOTE — PN
Date/Time of Note


Date/Time of Note


DATE: 12/31/16 


TIME: 11:09





Assessment/Plan


VTE Prophylaxis


VTE Prophylaxis Intervention:  SCD's





Lines/Catheters


IV Catheter Type (from Nrsg):  Peripheral IV





Assessment/Plan


Assessment/Plan


1. Severe Dysphagia 2/2 #2


2. 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


3. Inflammation and  ulceration of oral mucosa likely associated with radiation 

with likely candida superinfection


4. 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 

radiation


5. Pancytopenia with severe leucopenia likely 2/2 Chemo


6. Diffuse chronic skin rash associated with chronic itching  r/o psoriasis


7. Prev alcohol abuse


8. Partially calcified mass R jugular foramen, ?mets





PLAN:


Cont IVF / nystatin swish / abx mouthwash / pain control / antiemetics  and 

frequent suctioning


GI consult for possible endoscopy + G tube placement. Patient is ok with G-tube


Neutropenic precautions / Oncology consult for pancytopenia/ patient will 

likely benefit from Neupogen therapy


Obtain records from Outpt Rad oncologist Dr Bryson re: cancer staging and 

prognosis


antipruritic / steroid cream PRN for skin itching if patient requests


Supportive care


Will start scopolamine patch to help with secretion





Prophylaxis: IV PPI / SCDs.





Subjective


24 Hr Interval Summary


Free Text/Dictation


c/o continued dysphagia. denied pain





Exam/Review of Systems


Vital Signs


Vitals





 Vital Signs








  Date Time  Temp Pulse Resp B/P Pulse Ox O2 Delivery O2 Flow Rate FiO2


 


12/31/16 09:40    125/67    


 


12/31/16 08:43  95 16  97   21


 


12/31/16 08:22 98.1     Room Air  














 Intake and Output   


 


 12/30/16 12/30/16 12/31/16





 15:00 23:00 07:00


 


Intake Total 50 ml 871 ml 1050 ml


 


Output Total  360 ml 200 ml


 


Balance 50 ml 511 ml 850 ml











Exam


Constitutional:  alert, oriented, No distress


Head:  No atraumatic, No normocephalic 


HEENT   PERRL, nl conjunctiva, +dry, cracked and peeling lips with ulcerated 

and inflamed oral mucosa as well as with whitish coating diffusely extending to 

posterior pharynx


Respiratory:  + crackles, diminished breath sounds


Cardiovascular:  regular rate and rhythm, 


Gastrointestinal:  bowel sounds, non-tender, soft


Extremities:  No edema


Skin:  eythematous scaly rash or lesions 


Lymph:  enlarged





Results


Result Diagram:  


12/31/16 0433                                                                  

              12/31/16 0433





Results 24 hrs





Laboratory Tests








Test


  12/30/16


14:00 12/31/16


04:33


 


Urine Bacteria OCCASIONAL   


 


Urine Bilirubin NEGATIVE   


 


Urine Clarity CLEAR   


 


Urine Color YELLOW   


 


Urine Glucose NEGATIVE   


 


Urine Hemoglobin TRACE   


 


Urine Ketones NEGATIVE   


 


Urine Leukocyte Esterase NEGATIVE   


 


Urine Microscopic RBC 0-2   


 


Urine Microscopic WBC 2-5   


 


Urine Mucus FEW   


 


Urine Nitrite NEGATIVE   


 


Urine Specific Gravity 1.025   


 


Urine Squamous Epithelial


Cells FEW  


  


 


 


Urine Total Protein 1+  H 


 


Urine Urobilinogen 1.0 E.U./dL   


 


Urine pH 6.0   


 


Albumin  2.6  L


 


Anion Gap  14  


 


Anisocytosis  3+  


 


Band Neutrophils %  18.0  H


 


Basophils #    


 


Basophils %    


 


Blood Morphology Comment    


 


Blood Urea Nitrogen  13  


 


Calcium Level  8.3  L


 


Carbon Dioxide Level  29  


 


Chloride Level  99  


 


Creatinine  0.64  


 


Eosinophils #    


 


Eosinophils %    


 


Glucose Level  158  


 


Hematocrit  28.5  L


 


Hemoglobin  9.4  L


 


Hypochromasia  1+  


 


Lymphocytes #  0.7  L


 


Lymphocytes %  49.0  


 


Mean Corpuscular Hemoglobin  28.8  L


 


Mean Corpuscular Hemoglobin


Concent 


  33.0  


 


 


Mean Corpuscular Volume  87.1  


 


Mean Platelet Volume  9.1  


 


Metamyelocytes #  0.1  


 


Metamyelocytes %  5.0  H


 


Monocytes #  0.2  L


 


Monocytes %  16.0  H


 


Neutrophils #  0.2  L


 


Neutrophils %  12.0  L


 


Nucleated Red Blood Cells #    


 


Ovalocytes  OCCASIONAL  


 


Phosphorus Level  3.0  


 


Platelet Count  125  L


 


Platelet Estimate


  


  PLT APPEAR


DECREASED


 


Potassium Level  3.5  


 


Red Blood Count  3.27  L


 


Red Cell Distribution Width  23.3  H


 


Sodium Level  138  


 


White Blood Count  1.4  #L











Medications


Medications





 Current Medications


Dextrose/Sodium Chloride (D5-1/2ns) 1,000 ml @  100 mls/hr Q10H IV  Last 

administered on 12/31/16at 05:48; Admin Dose 100 MLS/HR;  Start 12/29/16 at 23:

00


Nystatin (Nystatin Susp) 5 ml QID PO  Last administered on 12/31/16at 09:34; 

Admin Dose 5 ML;  Start 12/29/16 at 23:00


Chlorhexidine Gluconate (Peridex) 15 ml Q12 MT  Last administered on 12/31/16at 

09:34; Admin Dose 15 ML;  Start 12/30/16 at 09:00


Ondansetron HCl (Zofran Inj) 4 mg Q6H  PRN IV NAUSEA AND/OR VOMITING;  Start 12/ 29/16 at 23:30


Pantoprazole 40 mg 40 mg BID@06,18 IV  Last administered on 12/31/16at 05:49; 

Admin Dose 40 MG;  Start 12/30/16 at 18:00


Cefepime HCl 50 ml @  100 mls/hr Q8 IVPB  Last administered on 12/31/16at 05:49

; Admin Dose 100 MLS/HR;  Start 12/30/16 at 14:00


Filgrastim/ Dextrose (Neupogen/D5W) 51 ml @  102 mls/hr DAILY@17 IVPB  Last 

administered on 12/30/16at 17:32; Admin Dose 102 MLS/HR;  Start 12/30/16 at 17:

00











JARVIS PHELPS MD Dec 31, 2016 11:10

## 2016-12-31 NOTE — CONS
Date/Time of Note


Date/Time of Note


DATE: 12/31/16 


TIME: 20:53





Assessment/Plan


Assessment/Plan


Chief Complaint/Hosp Course


Assessment


1. Severe Dysphagia 2/2 #2


2. Radiation esophagitis


3. Inflammation and  ulceration of oral mucosa likely associated with radiation 

with likely candida superinfection


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


5. Pancytopenia with severe leucopenia likely 2/2 Chemo


6. Prev alcohol abuse





PLAN:


Consider endoscopy + G tube placement per Dr. Ro's discretion. Cannot put 

PEG over weekend.


Agree wtih scopolamine patch to help with secretion


Problems:  





Consultation Date/Type/Reason


Admit Date/Time


Dec 29, 2016 at 20:57


Initial Consult Date


12/30/16


Type of Consultation:  Gastroenterology





24 HR Interval Summary


Free Text/Dictation


no n/v, no abdominal pain


Constitutional:  improved





Exam/Review of Systems


Vital Signs


Vitals





 Vital Signs








  Date Time  Temp Pulse Resp B/P Pulse Ox O2 Delivery O2 Flow Rate FiO2


 


12/31/16 20:28  90 20  95   21


 


12/31/16 09:40    125/67    


 


12/31/16 08:22 98.1     Room Air  














 Intake and Output   


 


 12/30/16 12/30/16 12/31/16





 15:00 23:00 07:00


 


Intake Total 50 ml 871 ml 1050 ml


 


Output Total  360 ml 200 ml


 


Balance 50 ml 511 ml 850 ml











Exam


Constitutional:  alert, oriented, well developed


Psych:  nl mood/affect, no complaints


Head:  atraumatic, normocephalic


Eyes:  EOMI, nl conjunctiva, nl lids, nl sclera


ENMT:  mucosa pink and moist, nl external ears & nose, nl lips & teeth, nl 

nasal mucosa & septum


Neck:  non-tender, supple


Respiratory:  clear to auscultation, normal air movement


Cardiovascular:  nl pulses, regular rate and rhythm


Gastrointestinal:  bowel sounds, non-tender, soft





Results


Result Diagram:  


12/31/16 0433 12/31/16 0433





Results 24 hrs





Laboratory Tests








Test


  12/31/16


04:33


 


Albumin 2.6  L


 


Anion Gap 14  


 


Anisocytosis 3+  


 


Band Neutrophils % 18.0  H


 


Basophils #   


 


Basophils %   


 


Blood Morphology Comment   


 


Blood Urea Nitrogen 13  


 


Calcium Level 8.3  L


 


Carbon Dioxide Level 29  


 


Chloride Level 99  


 


Creatinine 0.64  


 


Eosinophils #   


 


Eosinophils %   


 


Glucose Level 158  


 


Hematocrit 28.5  L


 


Hemoglobin 9.4  L


 


Hypochromasia 1+  


 


Lymphocytes # 0.7  L


 


Lymphocytes % 49.0  


 


Mean Corpuscular Hemoglobin 28.8  L


 


Mean Corpuscular Hemoglobin


Concent 33.0  


 


 


Mean Corpuscular Volume 87.1  


 


Mean Platelet Volume 9.1  


 


Metamyelocytes # 0.1  


 


Metamyelocytes % 5.0  H


 


Monocytes # 0.2  L


 


Monocytes % 16.0  H


 


Neutrophils # 0.2  L


 


Neutrophils % 12.0  L


 


Nucleated Red Blood Cells #   


 


Ovalocytes OCCASIONAL  


 


Phosphorus Level 3.0  


 


Platelet Count 125  L


 


Platelet Estimate


  PLT APPEAR


DECREASED


 


Potassium Level 3.5  


 


Red Blood Count 3.27  L


 


Red Cell Distribution Width 23.3  H


 


Sodium Level 138  


 


White Blood Count 1.4  #L











Medications


Medications





 Current Medications


Dextrose/Sodium Chloride (D5-1/2ns) 1,000 ml @  100 mls/hr Q10H IV  Last 

administered on 12/31/16at 14:36; Admin Dose 100 MLS/HR;  Start 12/29/16 at 23:

00


Nystatin (Nystatin Susp) 5 ml QID PO  Last administered on 12/31/16at 17:54; 

Admin Dose 5 ML;  Start 12/29/16 at 23:00


Chlorhexidine Gluconate (Peridex) 15 ml Q12 MT  Last administered on 12/31/16at 

09:34; Admin Dose 15 ML;  Start 12/30/16 at 09:00


Ondansetron HCl (Zofran Inj) 4 mg Q6H  PRN IV NAUSEA AND/OR VOMITING;  Start 12/ 29/16 at 23:30


Pantoprazole 40 mg 40 mg BID@06,18 IV  Last administered on 12/31/16at 17:54; 

Admin Dose 40 MG;  Start 12/30/16 at 18:00


Cefepime HCl 50 ml @  100 mls/hr Q8 IVPB  Last administered on 12/31/16at 14:37

; Admin Dose 100 MLS/HR;  Start 12/30/16 at 14:00


Filgrastim/ Dextrose (Neupogen/D5W) 51 ml @  102 mls/hr DAILY@17 IVPB  Last 

administered on 12/31/16at 17:07; Admin Dose 102 MLS/HR;  Start 12/30/16 at 17:

00


Scopolamine (Transderm-Scop) 1 patch Q72H TRANSDERM  Last administered on 12/31/ 16at 12:44; Admin Dose 1 PATCH;  Start 12/31/16 at 11:30











JEANCARLOS TOUSSAINT MD Dec 31, 2016 20:58

## 2017-01-01 NOTE — CONS
Date/Time of Note


Date/Time of Note


DATE: 1/1/17 


TIME: 16:29





Assessment/Plan


Assessment/Plan


Chief Complaint/Hosp Course


Assessment


1. Severe Dysphagia 2/2 #2


2. Radiation esophagitis


3. Inflammation and  ulceration of oral mucosa likely associated with radiation 

with likely candida superinfection: improving with improving neutropenia


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


5. Pancytopenia with severe leucopenia likely 2/2 Chemo: neutropenia improving.


6. Prev alcohol abuse


7. Hypokalemia





PLAN:


Consider endoscopy + G tube placement per Dr. Ro's discretion. Cannot put 

PEG over weekend.


As neutropenia improving, can proceed with PEG placement. Also cleared by 

medicine for PEG placement.


PEG planned for this coming Tuesday


Due to pt not eating from radiation esophagitis, I added D5 in IVF. KCL also 

added due to hypokalemia to replete electrolytes.


scopolamine patch to help with secretion


Problems:  





Consultation Date/Type/Reason


Admit Date/Time


Dec 29, 2016 at 20:57


Initial Consult Date


12/30/16


Type of Consultation:  Gastroenterology





24 HR Interval Summary


Free Text/Dictation


no n/v, no abdominal pain, passing gas





Exam/Review of Systems


Vital Signs


Vitals





 Vital Signs








  Date Time  Temp Pulse Resp B/P Pulse Ox O2 Delivery O2 Flow Rate FiO2


 


1/1/17 14:13  94 20  96   21


 


1/1/17 07:54 98.6   96/52    


 


12/31/16 20:00      Room Air  














 Intake and Output   


 


 12/31/16 12/31/16 1/1/17





 15:00 23:00 07:00


 


Intake Total 50 ml 550 ml 1080 ml


 


Output Total  300 ml 600 ml


 


Balance 50 ml 250 ml 480 ml











Exam


Constitutional:  alert, oriented, well developed


Head:  atraumatic, normocephalic


Eyes:  EOMI, nl conjunctiva, nl lids, nl sclera


ENMT:  mucosa pink and moist, nl external ears & nose, nl lips & teeth, nl 

nasal mucosa & septum


Neck:  non-tender, supple


Respiratory:  clear to auscultation, normal air movement


Cardiovascular:  nl pulses, regular rate and rhythm


Gastrointestinal:  bowel sounds, non-tender, soft





Results


Result Diagram:  


1/1/17 0445                                                                    

            1/1/17 0445





Results 24 hrs





Laboratory Tests








Test


  1/1/17


04:45


 


Albumin 2.2  L


 


Anion Gap 10  


 


Basophils # 0.0  


 


Basophils % 0.0  


 


Blood Morphology Comment   


 


Blood Urea Nitrogen 13  


 


Calcium Level 8.3  L


 


Carbon Dioxide Level 30  


 


Chloride Level 100  


 


Creatinine 0.61  


 


Eosinophils # 0.0  


 


Eosinophils % 0.0  


 


Glucose Level 142  


 


Hematocrit 27.4  L


 


Hemoglobin 9.1  L


 


Lymphocytes # 0.1  L


 


Lymphocytes % 2.4  L


 


Mean Corpuscular Hemoglobin 28.5  L


 


Mean Corpuscular Hemoglobin


Concent 33.0  


 


 


Mean Corpuscular Volume 86.4  


 


Mean Platelet Volume 9.4  


 


Monocytes # 0.6  


 


Monocytes % 10.8  


 


Neutrophils # 5.1  


 


Neutrophils % 86.8  H


 


Nucleated Red Blood Cells # 0.0  


 


Nucleated Red Blood Cells % 0.0  


 


Phosphorus Level 2.3  L


 


Platelet Count 145  


 


Potassium Level 3.1  L


 


Red Blood Count 3.17  L


 


Red Cell Distribution Width 23.5  H


 


Sodium Level 137  


 


White Blood Count 5.9  #











Medications


Medications





 Current Medications


Dextrose/Sodium Chloride (D5-1/2ns) 1,000 ml @  50 mls/hr Q20H IV  Last 

administered on 1/1/17at 13:09; Admin Dose 100 MLS/HR;  Start 12/29/16 at 23:00


Nystatin (Nystatin Susp) 5 ml QID PO  Last administered on 1/1/17at 13:08; 

Admin Dose 5 ML;  Start 12/29/16 at 23:00


Chlorhexidine Gluconate (Peridex) 15 ml Q12 MT  Last administered on 1/1/17at 09

:15; Admin Dose 15 ML;  Start 12/30/16 at 09:00


Ondansetron HCl (Zofran Inj) 4 mg Q6H  PRN IV NAUSEA AND/OR VOMITING;  Start 12/ 29/16 at 23:30


Pantoprazole 40 mg 40 mg BID@06,18 IV  Last administered on 1/1/17at 06:32; 

Admin Dose 40 MG;  Start 12/30/16 at 18:00


Cefepime HCl 50 ml @  100 mls/hr Q8 IVPB  Last administered on 1/1/17at 13:08; 

Admin Dose 100 MLS/HR;  Start 12/30/16 at 14:00


Filgrastim/ Dextrose (Neupogen/D5W) 51 ml @  102 mls/hr DAILY@17 IVPB  Last 

administered on 12/31/16at 17:07; Admin Dose 102 MLS/HR;  Start 12/30/16 at 17:

00


Scopolamine 1 patch 1 patch Q72H TRANSDERM  Last administered on 12/31/16at 12:

44; Admin Dose 1 PATCH;  Start 12/31/16 at 11:30


Potassium Chloride/Sodium Chloride (KCl/NS) 110 ml @  55 mls/hr ONCE  ONCE IVPB 

;  Start 1/1/17 at 15:30;  Stop 1/1/17 at 17:29











JEANCARLOS TOUSSAINT MD Jan 1, 2017 16:32

## 2017-01-01 NOTE — PN
Date/Time of Note


Date/Time of Note


DATE: 1/1/17 


TIME: 15:16





Assessment/Plan


VTE Prophylaxis


VTE Prophylaxis Intervention:  contraindicated


VTE Contraindication Reason:  blood coagulation disorder





Lines/Catheters


IV Catheter Type (from [facility]):  Peripheral IV


Urinary Cath still in place:  No





Assessment/Plan


Chief Complaint/Hosp Course


A/P


1 Dysphagia; stable; for PEG; low periop risk. may proceed from medical 

standpoint.


2 Likely Esophageal Candidiasis


3 Likely Radiation esophagitis


4 Parotid vs Tonsillary Cancer; sp chemo/xrt. RadOnc consult as needed


5 Neutropenia; sp neupogen; improved


6 Bph


7 Past etoh


8 Pneumonia vs atelectasis


9 Psoriasis?


Problems:  





Subjective


24 Hr Interval Summary


Free Text/Dictation


S- events noted. no distress. no sig fever/dyspnea.





Exam/Review of Systems


Vital Signs


Vitals





 Vital Signs








  Date Time  Temp Pulse Resp B/P Pulse Ox O2 Delivery O2 Flow Rate FiO2


 


1/1/17 14:13  94 20  96   21


 


1/1/17 07:54 98.6   96/52    


 


12/31/16 20:00      Room Air  














 Intake and Output   


 


 12/31/16 12/31/16 1/1/17





 15:00 23:00 07:00


 


Intake Total 50 ml 550 ml 1080 ml


 


Output Total  300 ml 600 ml


 


Balance 50 ml 250 ml 480 ml











Exam


Constitutional:  alert


Respiratory:  clear to auscultation


Cardiovascular:  regular rate and rhythm


Gastrointestinal:  non-tender (nd; no r r g), soft


Extremities:  other (no edema)





Results


Result Diagram:  


1/1/17 0445                                                                    

            1/1/17 0445





Results 24 hrs





Laboratory Tests








Test


  1/1/17


04:45


 


Albumin 2.2  L


 


Anion Gap 10  


 


Basophils # 0.0  


 


Basophils % 0.0  


 


Blood Morphology Comment   


 


Blood Urea Nitrogen 13  


 


Calcium Level 8.3  L


 


Carbon Dioxide Level 30  


 


Chloride Level 100  


 


Creatinine 0.61  


 


Eosinophils # 0.0  


 


Eosinophils % 0.0  


 


Glucose Level 142  


 


Hematocrit 27.4  L


 


Hemoglobin 9.1  L


 


Lymphocytes # 0.1  L


 


Lymphocytes % 2.4  L


 


Mean Corpuscular Hemoglobin 28.5  L


 


Mean Corpuscular Hemoglobin


Concent 33.0  


 


 


Mean Corpuscular Volume 86.4  


 


Mean Platelet Volume 9.4  


 


Monocytes # 0.6  


 


Monocytes % 10.8  


 


Neutrophils # 5.1  


 


Neutrophils % 86.8  H


 


Nucleated Red Blood Cells # 0.0  


 


Nucleated Red Blood Cells % 0.0  


 


Phosphorus Level 2.3  L


 


Platelet Count 145  


 


Potassium Level 3.1  L


 


Red Blood Count 3.17  L


 


Red Cell Distribution Width 23.5  H


 


Sodium Level 137  


 


White Blood Count 5.9  #











Medications


Medications





 Current Medications


Dextrose/Sodium Chloride (D5-1/2ns) 1,000 ml @  100 mls/hr Q10H IV  Last 

administered on 1/1/17at 13:09; Admin Dose 100 MLS/HR;  Start 12/29/16 at 23:00


Nystatin (Nystatin Susp) 5 ml QID PO  Last administered on 1/1/17at 13:08; 

Admin Dose 5 ML;  Start 12/29/16 at 23:00


Chlorhexidine Gluconate (Peridex) 15 ml Q12 MT  Last administered on 1/1/17at 09

:15; Admin Dose 15 ML;  Start 12/30/16 at 09:00


Ondansetron HCl (Zofran Inj) 4 mg Q6H  PRN IV NAUSEA AND/OR VOMITING;  Start 12/ 29/16 at 23:30


Pantoprazole 40 mg 40 mg BID@06,18 IV  Last administered on 1/1/17at 06:32; 

Admin Dose 40 MG;  Start 12/30/16 at 18:00


Cefepime HCl 50 ml @  100 mls/hr Q8 IVPB  Last administered on 1/1/17at 13:08; 

Admin Dose 100 MLS/HR;  Start 12/30/16 at 14:00


Filgrastim/ Dextrose (Neupogen/D5W) 51 ml @  102 mls/hr DAILY@17 IVPB  Last 

administered on 12/31/16at 17:07; Admin Dose 102 MLS/HR;  Start 12/30/16 at 17:

00


Scopolamine (Transderm-Scop) 1 patch Q72H TRANSDERM  Last administered on 12/31/ 16at 12:44; Admin Dose 1 PATCH;  Start 12/31/16 at 11:30











CHRISTINA OSUNA MD Jan 1, 2017 15:21

## 2017-01-02 NOTE — CONS
Date/Time of Note


Date/Time of Note


DATE: 1/2/17 


TIME: 09:35





Assessment/Plan


Assessment/Plan


Chief Complaint/Hosp Course


Assessment


1. Severe Dysphagia 2/2 #2


2. Radiation esophagitis


3. Inflammation and  ulceration of oral mucosa likely associated with radiation 

with likely candida superinfection: improving with improving neutropenia


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


5. Pancytopenia with severe leucopenia likely 2/2 Chemo: neutropenia improving.


6. Prev alcohol abuse


7. Hypokalemia





PLAN:


Consider endoscopy + G tube placement per Dr. Ro's discretion. Cannot put 

PEG over weekend.


As neutropenia improving, can proceed with PEG placement. Also cleared by 

medicine for PEG placement.


PEG planned for this coming Tuesday or Wednesday depending on Dr. Ro's 

schedule


Due to pt not eating from radiation esophagitis, continue D5 NS with KCL also 

added due to hypokalemia to replete electrolytes.


scopolamine patch to help with secretion


Problems:  





Consultation Date/Type/Reason


Admit Date/Time


Dec 29, 2016 at 20:57


Initial Consult Date


12/30/16


Type of Consultation:  Gastroenterology





24 HR Interval Summary


Free Text/Dictation


no n/v, no abdominal pain


Constitutional:  improved





Exam/Review of Systems


Vital Signs


Vitals





 Vital Signs








  Date Time  Temp Pulse Resp B/P Pulse Ox O2 Delivery O2 Flow Rate FiO2


 


1/2/17 08:59 98.0  18 110/64 98   


 


1/1/17 20:05  86      


 


1/1/17 17:13        21


 


12/31/16 20:00      Room Air  














 Intake and Output   


 


 1/1/17 1/1/17 1/2/17





 15:00 23:00 07:00


 


Intake Total 50 ml 205 ml 0 ml


 


Output Total  300 ml 500 ml


 


Balance 50 ml -95 ml -500 ml











Exam


Constitutional:  alert, oriented, well developed


Psych:  nl mood/affect, no complaints


Head:  atraumatic, normocephalic


Eyes:  EOMI, nl conjunctiva, nl lids, nl sclera


ENMT:  mucosa pink and moist, nl external ears & nose, nl lips & teeth, nl 

nasal mucosa & septum


Neck:  non-tender, supple


Respiratory:  clear to auscultation, normal air movement


Cardiovascular:  nl pulses, regular rate and rhythm


Gastrointestinal:  bowel sounds, non-tender, soft





Results


Result Diagram:  


1/2/17 0425                                                                    

            1/2/17 0425





Results 24 hrs





Laboratory Tests








Test


  1/2/17


04:25 1/2/17


04:45


 


Alanine Aminotransferase


(ALT/SGPT) 51  


  


 


 


Albumin 2.2  L 


 


Albumin/Globulin Ratio 0.73   


 


Alkaline Phosphatase 57   


 


Anion Gap 9   


 


Aspartate Amino Transf


(AST/SGOT) 41  


  


 


 


Basophils # 0.0   


 


Basophils % 0.0   


 


Blood Morphology Comment    


 


Blood Urea Nitrogen 8   


 


Calcium Level 8.1  L 


 


Carbon Dioxide Level 31   


 


Chloride Level 102   


 


Creatinine 0.61   


 


Direct Bilirubin 0.00   


 


Eosinophils # 0.0   


 


Eosinophils % 0.0   


 


Globulin 3.00   


 


Glucose Level 113   


 


Hematocrit 27.5  L 


 


Hemoglobin 9.0  L 


 


Indirect Bilirubin 0.5   


 


Lymphocytes # 0.2  L 


 


Lymphocytes % 2.0  L 


 


Magnesium Level 1.7   


 


Mean Corpuscular Hemoglobin 28.3  L 


 


Mean Corpuscular Hemoglobin


Concent 32.7  


  


 


 


Mean Corpuscular Volume 86.6   


 


Mean Platelet Volume 9.0   


 


Monocytes # 0.6   


 


Monocytes % 4.7   


 


Neutrophils # 11.6  H 


 


Neutrophils % 93.3  H 


 


Nucleated Red Blood Cells # 0.0   


 


Nucleated Red Blood Cells % 0.0   


 


Phosphorus Level 1.7  L 


 


Platelet Count 162   


 


Platelet Estimate


  PLT APPEAR


ADEQUATE 


 


 


Potassium Level 3.4  L 


 


Red Blood Count 3.18  L 


 


Red Cell Distribution Width 23.0  H 


 


Sodium Level 139   


 


Thyroid Stimulating Hormone


(TSH) 1.320  


  


 


 


Total Bilirubin 0.5   


 


Total Protein 5.2  L 


 


White Blood Count 12.4  #H 


 


Activated Partial


Thromboplast Time 


  36.1  H


 


 


Hemoglobin A1c  6.2  H


 


INR International Normalized


Ratio 


  1.32  


 


 


Prothrombin Time  16.5  H


 


Prothrombin Time Ratio  1.3  











Medications


Medications





 Current Medications


Nystatin (Nystatin Susp) 5 ml QID PO  Last administered on 1/2/17at 08:29; 

Admin Dose 5 ML;  Start 12/29/16 at 23:00


Chlorhexidine Gluconate (Peridex) 15 ml Q12 MT  Last administered on 1/2/17at 08

:29; Admin Dose 15 ML;  Start 12/30/16 at 09:00


Ondansetron HCl (Zofran Inj) 4 mg Q6H  PRN IV NAUSEA AND/OR VOMITING;  Start 12/ 29/16 at 23:30


Pantoprazole 40 mg 40 mg BID@06,18 IV  Last administered on 1/2/17at 06:07; 

Admin Dose 40 MG;  Start 12/30/16 at 18:00


Cefepime HCl 50 ml @  100 mls/hr Q8 IVPB  Last administered on 1/2/17at 06:08; 

Admin Dose 100 MLS/HR;  Start 12/30/16 at 14:00


Filgrastim/ Dextrose (Neupogen/D5W) 51 ml @  102 mls/hr DAILY@17 IVPB  Last 

administered on 1/1/17at 16:45; Admin Dose 102 MLS/HR;  Start 12/30/16 at 17:00


Scopolamine 1 patch 1 patch Q72H TRANSDERM  Last administered on 12/31/16at 12:

44; Admin Dose 1 PATCH;  Start 12/31/16 at 11:30


Potassium Chloride/Dextrose/ Sod Cl (D5-NS + KCl 40 Meq) 1,000 ml @  75 mls/hr 

[id number] IV  Last administered on 1/1/17at 18:16; Admin Dose 75 MLS/HR;  Start 1/ 1/17 at 16:30











JEANCARLOS TOUSSAINT MD Jan 2, 2017 09:38

## 2017-01-02 NOTE — PN
Date/Time of Note


Date/Time of Note


DATE: 1/2/17 


TIME: 12:11





Assessment/Plan


VTE Prophylaxis


VTE Prophylaxis Intervention:  SCD's





Lines/Catheters


IV Catheter Type (from [facility]):  Peripheral IV


Urinary Cath still in place:  No





Assessment/Plan


Assessment/Plan


1. Severe Dysphagia 2/2 #2


2. 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


3. Inflammation and  ulceration of oral mucosa likely associated with radiation 

with likely candida superinfection


4. 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 

radiation


5. Pancytopenia with initial severe leucopenia likely 2/2 Chemo: resolved. Will 

d/c Neupogen


6. Diffuse chronic skin rash associated with chronic itching  r/o psoriasis


7. Prev alcohol abuse


8. Partially calcified mass R jugular foramen, ?mets





PLAN:


Cont IVF / nystatin swish / abx mouthwash / pain control / antiemetics  and 

frequent suctioning


GI ob board for possible endoscopy + G tube placement. Patient is ok with G-tube


WBC today 12 and . d/c Neupogen


antipruritic / steroid cream PRN for skin itching 


Cont scopolamine patch to help with secretion





Prophylaxis: IV PPI / SCDs.





Subjective


24 Hr Interval Summary


Free Text/Dictation


c/o constipation. Said oral secretion has improved and he feels stronger





Exam/Review of Systems


Vital Signs


Vitals





 Vital Signs








  Date Time  Temp Pulse Resp B/P Pulse Ox O2 Delivery O2 Flow Rate FiO2


 


1/2/17 09:58  83      


 


1/2/17 08:59 98.0  18 110/64 98   


 


1/1/17 17:13        21


 


12/31/16 20:00      Room Air  














 Intake and Output   


 


 1/1/17 1/1/17 1/2/17





 14:59 22:59 06:59


 


Intake Total 50 ml 205 ml 0 ml


 


Output Total  300 ml 500 ml


 


Balance 50 ml -95 ml -500 ml











Exam


Constitutional:  alert, oriented, No distress


Head:  No atraumatic, No normocephalic 


HEENT   PERRL, nl conjunctiva, +dry, cracked and peeling lips with ulcerated 

and inflamed oral mucosa as well as with whitish coating diffusely extending to 

posterior pharynx


Respiratory:  + crackles, diminished breath sounds


Cardiovascular:  regular rate and rhythm, 


Gastrointestinal:  bowel sounds, non-tender, soft


Extremities:  No edema


Skin:  eythematous scaly rash or lesions 


Lymph:  enlarged





Results


Result Diagram:  


1/2/17 0425                                                                    

            1/2/17 0425





Results 24 hrs





Laboratory Tests








Test


  1/2/17


04:25 1/2/17


04:45


 


Alanine Aminotransferase


(ALT/SGPT) 51  


  


 


 


Albumin 2.2  L 


 


Albumin/Globulin Ratio 0.73   


 


Alkaline Phosphatase 57   


 


Anion Gap 9   


 


Aspartate Amino Transf


(AST/SGOT) 41  


  


 


 


Basophils # 0.0   


 


Basophils % 0.0   


 


Blood Morphology Comment    


 


Blood Urea Nitrogen 8   


 


Calcium Level 8.1  L 


 


Carbon Dioxide Level 31   


 


Chloride Level 102   


 


Creatinine 0.61   


 


Direct Bilirubin 0.00   


 


Eosinophils # 0.0   


 


Eosinophils % 0.0   


 


Globulin 3.00   


 


Glucose Level 113   


 


Hematocrit 27.5  L 


 


Hemoglobin 9.0  L 


 


Indirect Bilirubin 0.5   


 


Lymphocytes # 0.2  L 


 


Lymphocytes % 2.0  L 


 


Magnesium Level 1.7   


 


Mean Corpuscular Hemoglobin 28.3  L 


 


Mean Corpuscular Hemoglobin


Concent 32.7  


  


 


 


Mean Corpuscular Volume 86.6   


 


Mean Platelet Volume 9.0   


 


Monocytes # 0.6   


 


Monocytes % 4.7   


 


Neutrophils # 11.6  H 


 


Neutrophils % 93.3  H 


 


Nucleated Red Blood Cells # 0.0   


 


Nucleated Red Blood Cells % 0.0   


 


Phosphorus Level 1.7  L 


 


Platelet Count 162   


 


Platelet Estimate


  PLT APPEAR


ADEQUATE 


 


 


Potassium Level 3.4  L 


 


Red Blood Count 3.18  L 


 


Red Cell Distribution Width 23.0  H 


 


Sodium Level 139   


 


Thyroid Stimulating Hormone


(TSH) 1.320  


  


 


 


Total Bilirubin 0.5   


 


Total Protein 5.2  L 


 


White Blood Count 12.4  #H 


 


Activated Partial


Thromboplast Time 


  36.1  H


 


 


Hemoglobin A1c  6.2  H


 


INR International Normalized


Ratio 


  1.32  


 


 


Prothrombin Time  16.5  H


 


Prothrombin Time Ratio  1.3  











Medications


Medications





 Current Medications


Nystatin (Nystatin Susp) 5 ml QID PO  Last administered on 1/2/17at 08:29; 

Admin Dose 5 ML;  Start 12/29/16 at 23:00


Chlorhexidine Gluconate (Peridex) 15 ml Q12 MT  Last administered on 1/2/17at 08

:29; Admin Dose 15 ML;  Start 12/30/16 at 09:00


Ondansetron HCl (Zofran Inj) 4 mg Q6H  PRN IV NAUSEA AND/OR VOMITING;  Start 12/ 29/16 at 23:30


Pantoprazole 40 mg 40 mg BID@06,18 IV  Last administered on 1/2/17at 06:07; 

Admin Dose 40 MG;  Start 12/30/16 at 18:00


Cefepime HCl 50 ml @  100 mls/hr Q8 IVPB  Last administered on 1/2/17at 06:08; 

Admin Dose 100 MLS/HR;  Start 12/30/16 at 14:00


Filgrastim/ Dextrose (Neupogen/D5W) 51 ml @  102 mls/hr DAILY@17 IVPB  Last 

administered on 1/1/17at 16:45; Admin Dose 102 MLS/HR;  Start 12/30/16 at 17:00


Scopolamine 1 patch 1 patch Q72H TRANSDERM  Last administered on 12/31/16at 12:

44; Admin Dose 1 PATCH;  Start 12/31/16 at 11:30


Potassium Chloride/Dextrose/ Sod Cl (D5-NS + KCl 40 Meq) 1,000 ml @  75 mls/hr 

[id number] IV  Last administered on 1/1/17at 18:16; Admin Dose 75 MLS/HR;  Start 1/ 1/17 at 16:30











JARVIS PHELPS MD Jan 2, 2017 12:15

## 2017-01-03 NOTE — CONS
Date/Time of Note


Date/Time of Note


DATE: 1/3/17 


TIME: 14:59





Assessment/Plan


Assessment/Plan


Chief Complaint/Hosp Course


The patient is a 81 year old male who is currently undergoing radiation therapy 

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  He received 

neoadjuvant carbo/taxol with good response x 3 cycles, starting in September 2016 by Dr. Baker, and has now received 4 weeks of radiation therapy, last 

12/27/16 with Dr. Eugenio Bryson, along with continued platinum/taxane 

chemotherapy, last 2 weeks ago per Dr. Baker. He has been having worsening 

dysphagia over the last couple of months and now it's so severe that he cannot 

even tolerate liquids without coughing. He was advised to go to the ER for 

evaluation by Dr. Bryson's covering partner, Dr. Rica Aguirre, for dehydration

, and was admitted for severe dysphagia and found to have neutropenic fever.


c


Problems:  


(1) Neutropenia


Status:  Acute


Qualifiers:  


   Neutropenia type:  unspecified  Qualified Code:  D70.9 - Neutropenia, 

unspecified type


(2) Radiation esophagitis


Status:  Acute


Additional Assessment/Plan


- Neutropenia related to chemotherapy, last approximately 2 weeks ago per Dr. Baker. This has resolved. Neupogen has been discontinued


- Blood and Urine cultures are negative. Although CXR does demonstrate LLL 

pneumonia. Pt is is on Cefepime which he can continue. Would also consider ID 

consult if patient does not improve.


- Transfuse Hgb < 8, plt < 10 or if bleeding


- Agree with G tube for severe dysphagia. TO be done today


- Continue nystatin swish and pain control per primary team


- Would recommend social work/case management involvement for social issues (

per Dr. Aguirre, patient lives in car with cat, however patient states that he 

lives with a friend)


- Pt may resume radiation tomorrow after g tube placement





Approximately 40 min were spent at patient's bedside and in coordination of his 

care





Consultation Date/Type/Reason


Admit Date/Time


Dec 29, 2016 at 20:57


Initial Consult Date


12/30/16


Type of Consultation:  oncology


Reason for Consultation


head and neck ca


Referring Provider:  TIM MACK





24 HR Interval Summary


Free Text/Dictation


no acute overnight events. pt is scheduled for PEG tube today. no longer 

neutropenic





Exam/Review of Systems


Vital Signs


Vitals





 Vital Signs








  Date Time  Temp Pulse Resp B/P Pulse Ox O2 Delivery O2 Flow Rate FiO2


 


1/3/17 07:53 98.2 66 20 97/53 96   


 


1/1/17 17:13        21


 


12/31/16 20:00      Room Air  














 Intake and Output   


 


 1/2/17 1/2/17 1/3/17





 15:00 23:00 07:00


 


Intake Total 0 ml 950 ml 950 ml


 


Output Total 600 ml 900 ml 600 ml


 


Balance -600 ml 50 ml 350 ml











Exam


Constitutional:  alert, frail, oriented


Psych:  anxiety, depression


Head:  normocephalic


Eyes:  nl conjunctiva


ENMT:  other (radiation hyperpigmentation over his face)


Neck:  non-tender, supple


Respiratory:  clear to auscultation, normal air movement


Cardiovascular:  regular rate and rhythm


Gastrointestinal:  soft


Musculoskeletal:  nl extremities to inspection, nl gait and stance


Extremities:  normal pulses





Results


Result Diagram:  


1/3/17 0416                                                                    

            1/2/17 0425





Results 24 hrs





Laboratory Tests








Test


  1/3/17


04:16


 


Activated Partial


Thromboplast Time 35.0  


 


 


Anisocytosis 3+  


 


Band Neutrophils % 4.0  


 


Blood Morphology Comment   


 


Hematocrit 29.4  L


 


Hemoglobin 9.6  L


 


Hypochromasia 1+  


 


INR International Normalized


Ratio 1.40  


 


 


Lymphocytes # 0.5  L


 


Lymphocytes % 6.0  L


 


Mean Corpuscular Hemoglobin 28.6  L


 


Mean Corpuscular Hemoglobin


Concent 32.8  


 


 


Mean Corpuscular Volume 87.1  


 


Mean Platelet Volume 9.3  


 


Monocytes # 0.3  


 


Monocytes % 4.0  


 


Neutrophils # 6.5  


 


Neutrophils % 86.0  H


 


Platelet Count 177  


 


Platelet Estimate


  PLT APPEAR


ADEQUATE


 


Prothrombin Time 17.2  H


 


Prothrombin Time Ratio 1.3  


 


Red Blood Count 3.38  L


 


Red Cell Distribution Width 23.1  H


 


White Blood Count 7.5  #











Medications


Medications





 Current Medications


Nystatin (Nystatin Susp) 5 ml QID PO  Last administered on 1/3/17at 12:50; 

Admin Dose 5 ML;  Start 12/29/16 at 23:00


Chlorhexidine Gluconate (Peridex) 15 ml Q12 MT  Last administered on 1/3/17at 08

:21; Admin Dose 15 ML;  Start 12/30/16 at 09:00


Ondansetron HCl (Zofran Inj) 4 mg Q6H  PRN IV NAUSEA AND/OR VOMITING;  Start 12/ 29/16 at 23:30


Pantoprazole 40 mg 40 mg BID@06,18 IV  Last administered on 1/3/17at 06:06; 

Admin Dose 40 MG;  Start 12/30/16 at 18:00


Cefepime HCl (Maxipime 2gm/50 ml (Pmx)) 50 ml @  100 mls/hr Q8 IVPB  Last 

administered on 1/3/17at 13:34; Admin Dose 100 MLS/HR;  Start 12/30/16 at 14:00


Scopolamine 1 patch 1 patch Q72H TRANSDERM  Last administered on 1/3/17at 11:47

; Admin Dose 1 PATCH;  Start 12/31/16 at 11:30


Potassium Chloride/Dextrose/ Sod Cl (D5-NS + KCl 40 Meq) 1,000 ml @  75 mls/hr 

[id number] IV  Last administered on 1/3/17at 02:58; Admin Dose 75 MLS/HR;  Start 1/ 1/17 at 16:30


Bisacodyl (Dulcolax Supp) 10 mg DAILY  PRN [place] CONSTIPATION Last administered on 

1/2/17at 12:58; Admin Dose 10 MG;  Start 1/2/17 at 12:30











CODI STRICKLAND M.D. Brad 3, 2017 15:05

## 2017-01-03 NOTE — GILP
DATE OF PROCEDURE:  01/03/2017

 

 

DATE:  01/03/2017

 

NAME OF PROCEDURE:  Esophagogastroduodenoscopy with percutaneous endoscopic gastrostomy tube placeme
nt.

 

SURGEON: Gunnar Ro MD.

 

HISTORY AND INDICATIONS:  The patient with a radiation injury to the pharynx and upper esophagus.

 

INSTRUMENT USED:  Olympus panendoscope.

 

TECHNIQUE: After informed consent, with the patient and/or family members understanding the procedur
e, its indications, potential risks and complications, including but not limited to: allergic reacti
on, bleeding, perforation, infection or leakage, and after all pertinent questions were answered to 
the patient and/or family members satisfaction, the patient and/or family member signed witnessed in
formed consent. 

 

Following this, premedication was administered slowly IV push, under careful cardiovascular and resp
iratory monitoring with pulse oximetry, blood pressure and EKG monitor.  Once the sedative effect wa
s achieved the patient was place in the supine position, the panendoscope was introduced and advance
d under visual guidance.

Careful examination of the upper gastrointestinal tract, on insertion as well as withdrawal of the i
nstrument disclosed the following findings:

 

ESOPHAGUS: The posterior pharynx and upper esophagus shows significant erythema and edema of the muc
delaney.

 

The distal esophagus shows mild erythema of the mucosa at the EG junction.

 

STOMACH:  Upon entrance to the stomach, air was insufflated, the gastric walls distended normally.  
The mucosa appears unremarkable.

 

PYLORUS:  The pylorus appears patent and within normal limits, with no evidence of gastric outlet ob
struction.

 

DUODENUM:  The duodenal mucosa was carefully examined in the duodenal bulb as well as the second por
tion of the duodenum and appears unremarkable with no evidence of duodenitis, ulcer, or neoplasm.

 

The instrument was then brought back to the stomach and the anterior wall mid-body was identified by
 transillumination and "finger indentation," this area was then marked in the anterior wall of the a
bdomen, it was cleansed with Betadine and infiltrated with Xylocaine 1%.  Following this a trocar ne
edle was introduced into the gastric lumen under visual control with the endoscope, once in the sallie
marce lumen a guide wire was advanced and secured with a polypectomy snare, at this point the endoscop
e was withdrawn bringing the guide wire out through the patients mouth.  Following this a gastrostom
y tube was introduced over the guide wire, with the Sacks-Sarah technique without difficulty, a smal
l incision was performed in the skin to allow easy passage of the G-tube, once the position of the g
astrostomy tube was confirmed, the external stopper and connectors were installed, and a clean dress
ing applied. 

 

The patient tolerated the procedure well and was transferred out of the endoscopy suite awake, and i
n good condition to continue recovery under observation, feedings will started in the next 12-24h an
d gastrostomy care will be instituted.

 

IMPRESSION: 

1.  Severe radiation injury to the posterior pharynx and upper esophagus.

2.  Mild distal esophagitis.

3.  Post an uneventful percutaneous endoscopic gastrostomy tube placement with placement of [place] 2
0 gastrostomy tube.

 

PLAN:  PPI therapy.  Feedings will started in the morning.

 

 

Dictated By: GUNNAR RO MS/ZURDO

DD:    01/03/2017 17:08:51

DT:    01/03/2017 18:13:45

Conf#: [id number]

DID#:  [id number]

CC: GUNNAR RO; TIM MACK MD;*[facility]*

## 2017-01-03 NOTE — PN
Date/Time of Note


Date/Time of Note


DATE: 1/3/17 


TIME: 19:41





Assessment/Plan


VTE Prophylaxis


VTE Prophylaxis Intervention:  SCD's





Lines/Catheters


IV Catheter Type (from [facility]):  Peripheral IV


Urinary Cath still in place:  No





Assessment/Plan


Assessment/Plan





IMPRESSION


1. Severe Dysphagia 2/2 #2


2. 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


3. Inflammation and  ulceration of oral mucosa likely associated with radiation 

with likely candida superinfection


4. 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 

radiation


5. Pancytopenia with initial severe leucopenia likely 2/2 Chemo: resolved. s/p 

Neupogen


6. Diffuse chronic skin rash associated with chronic itching  r/o psoriasis


7. Prev alcohol abuse


8. Partially calcified mass R jugular foramen, ?mets





PLAN:


Cont IVF / nystatin swish / abx mouthwash / pain control / antiemetics  and 

frequent suctioning


pt is now s/p PEG tube placement. appreciate GI


WBC today 12 and . d/c Neupogen


antipruritic / steroid cream PRN for skin itching 


Cont scopolamine patch to help with secretion





Prophylaxis: IV PPI / SCDs.





Subjective


24 Hr Interval Summary


Free Text/Dictation


feels better with less secretion. he is also s/p PEG tube placement.





Exam/Review of Systems


Vital Signs


Vitals





 Vital Signs








  Date Time  Temp Pulse Resp B/P Pulse Ox O2 Delivery O2 Flow Rate FiO2


 


1/3/17 18:45  75 16 131/80 99 Room Air  


 


1/3/17 18:29        97


 


1/3/17 16:35 98.2       














 Intake and Output   


 


 1/2/17 1/2/17 1/3/17





 15:00 23:00 07:00


 


Intake Total 0 ml 950 ml 950 ml


 


Output Total 600 ml 900 ml 600 ml


 


Balance -600 ml 50 ml 350 ml











Exam


Constitutional:  alert, oriented, No distress


Head:  No atraumatic, No normocephalic 


HEENT   PERRL, nl conjunctiva, +dry, cracked and peeling lips with ulcerated 

and inflamed oral mucosa as well as with whitish coating diffusely extending to 

posterior pharynx


Respiratory:  + crackles, diminished breath sounds


Cardiovascular:  regular rate and rhythm, 


Gastrointestinal:  bowel sounds, non-tender, soft


Extremities:  No edema


Skin:  eythematous scaly rash or lesions 


Lymph:  enlarged





Results


Result Diagram:  


1/3/17 0416                                                                    

            1/2/17 0425





Results 24 hrs





Laboratory Tests








Test


  1/3/17


04:16


 


Activated Partial


Thromboplast Time 35.0  


 


 


Anisocytosis 3+  


 


Band Neutrophils % 4.0  


 


Blood Morphology Comment   


 


Hematocrit 29.4  L


 


Hemoglobin 9.6  L


 


Hypochromasia 1+  


 


INR International Normalized


Ratio 1.40  


 


 


Lymphocytes # 0.5  L


 


Lymphocytes % 6.0  L


 


Mean Corpuscular Hemoglobin 28.6  L


 


Mean Corpuscular Hemoglobin


Concent 32.8  


 


 


Mean Corpuscular Volume 87.1  


 


Mean Platelet Volume 9.3  


 


Monocytes # 0.3  


 


Monocytes % 4.0  


 


Neutrophils # 6.5  


 


Neutrophils % 86.0  H


 


Platelet Count 177  


 


Platelet Estimate


  PLT APPEAR


ADEQUATE


 


Prothrombin Time 17.2  H


 


Prothrombin Time Ratio 1.3  


 


Red Blood Count 3.38  L


 


Red Cell Distribution Width 23.1  H


 


White Blood Count 7.5  #











Medications


Medications





 Current Medications


Nystatin (Nystatin Susp) 5 ml QID PO  Last administered on 1/3/17at 17:13; 

Admin Dose 5 ML;  Start 12/29/16 at 23:00


Chlorhexidine Gluconate (Peridex) 15 ml Q12 MT  Last administered on 1/3/17at 08

:21; Admin Dose 15 ML;  Start 12/30/16 at 09:00


Ondansetron HCl (Zofran Inj) 4 mg Q6H  PRN IV NAUSEA AND/OR VOMITING;  Start 12/ 29/16 at 23:30


Pantoprazole 40 mg 40 mg BID@06,18 IV  Last administered on 1/3/17at 17:13; 

Admin Dose 40 MG;  Start 12/30/16 at 18:00


Cefepime HCl (Maxipime 2gm/50 ml (Pmx)) 50 ml @  100 mls/hr Q8 IVPB  Last 

administered on 1/3/17at 13:34; Admin Dose 100 MLS/HR;  Start 12/30/16 at 14:00


Scopolamine (Transderm-Scop) 1 patch Q72H TRANSDERM  Last administered on 1/3/

17at 11:47; Admin Dose 1 PATCH;  Start 12/31/16 at 11:30


Bisacodyl 10 mg 10 mg DAILY  PRN [place] CONSTIPATION Last administered on 1/2/17at 

12:58; Admin Dose 10 MG;  Start 1/2/17 at 12:30


Potassium Chloride/Dextrose/ Sodium Chloride (KCl/D5-NS) 1,000 ml @  75 mls/hr 

[id number] IV ;  Start 1/3/17 at 19:30











JARVIS PHELPS MD Brad 3, 2017 19:45

## 2017-01-04 NOTE — CONS
Date/Time of Note


Date/Time of Note


DATE: 1/4/17 


TIME: 15:04





Assessment/Plan


Assessment/Plan


Chief Complaint/Hosp Course


The patient is a 81 year old male who is currently undergoing radiation therapy 

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  He received 

neoadjuvant carbo/taxol with good response x 3 cycles, starting in September 2016 by Dr. Baker, and has now received 4 weeks of radiation therapy, last 

12/27/16 with Dr. Eugenio Bryson, along with continued platinum/taxane 

chemotherapy, last 2 weeks ago per Dr. Baker. He has been having worsening 

dysphagia over the last couple of months and now it's so severe that he cannot 

even tolerate liquids without coughing. He was advised to go to the ER for 

evaluation by Dr. Bryson's covering partner, Dr. Rica Aguirre, for dehydration

, and was admitted for severe dysphagia and found to have neutropenic fever.





Problems:  


(1) Neutropenia


Status:  Acute


Qualifiers:  


   Neutropenia type:  unspecified  Qualified Code:  D70.9 - Neutropenia, 

unspecified type


(2) Radiation esophagitis


Status:  Acute


Additional Assessment/Plan


- Neutropenia related to chemotherapy, last approximately 2 weeks ago per Dr. Baker. This has resolved. Neupogen has been discontinued


- Blood and Urine cultures are negative. Although CXR does demonstrate LLL 

pneumonia. Pt is is on Cefepime which he can continue. Would also consider ID 

consult if patient does not improve.


- Transfuse Hgb < 8, plt < 10 or if bleeding


- s/p G tube for severe dysphagia 1/3/17


- Continue nystatin swish and pain control per primary team


- Would recommend possible SNF placement per patient request for nursing and 

social support, tube feedings, etc.  He states that he lives with "druggies" in 

the apartment building and cannot take care of himself at home.


- Pt may resume radiation tomorrow after g tube placement though patient 

hesitant to do so - will discuss with Dr. Bryson though patient encouraged to 

complete course if able


Problems:  





Consultation Date/Type/Reason


Admit Date/Time


Dec 29, 2016 at 20:57


Initial Consult Date


12/30/16


Type of Consultation:  Hematology/oncology


Referring Provider:  TIM MACK





24 HR Interval Summary


Free Text/Dictation


The patient states that he feels slightly better as he is receiving tube feeds.

  He denies pain at the present time but has had severe throat pain from 

radiation and wonders whether he can withstand continuing radiation.  He does 

note that he feels his tonsil is shrinking.





Exam/Review of Systems


Vital Signs


Vitals





 Vital Signs








  Date Time  Temp Pulse Resp B/P Pulse Ox O2 Delivery O2 Flow Rate FiO2


 


1/4/17 07:56 97.5 88 16 92/58 98   


 


1/3/17 21:21      Nasal Cannula 2 


 


1/3/17 18:29        97














 Intake and Output   


 


 1/3/17 1/3/17 1/4/17





 15:00 23:00 07:00


 


Intake Total 50 ml 750 ml 800 ml


 


Output Total  600 ml 600 ml


 


Balance 50 ml 150 ml 200 ml











Exam


Constitutional:  alert, frail, oriented


Psych:  anxiety, depression


Head:  normocephalic


Eyes:  nl conjunctiva


ENMT:  other (radiation hyperpigmentation over his face)


Neck:  non-tender, supple


Respiratory:  clear to auscultation, normal air movement


Cardiovascular:  regular rate and rhythm


Gastrointestinal:  soft


Musculoskeletal:  nl extremities to inspection, nl gait and stance


Extremities:  normal pulses





Results


Result Diagram:  


1/4/17 1342                                                                    

            1/4/17 1342





Results 24 hrs





Laboratory Tests








Test


  1/4/17


13:42


 


Alanine Aminotransferase


(ALT/SGPT) 30  


 


 


Albumin 2.5  L


 


Albumin/Globulin Ratio 0.86  


 


Alkaline Phosphatase 49  


 


Anion Gap 12  


 


Aspartate Amino Transf


(AST/SGOT) 18  


 


 


Basophils # 0.0  


 


Basophils % 0.0  


 


Blood Morphology Comment   


 


Blood Urea Nitrogen 10  


 


Calcium Level 8.1  L


 


Carbon Dioxide Level 28  


 


Chloride Level 103  


 


Creatinine 0.50  L


 


Direct Bilirubin 0.00  


 


Eosinophils # 0.0  


 


Eosinophils % 0.1  


 


Globulin 2.90  


 


Glucose Level 153  


 


Hematocrit 31.7  L


 


Hemoglobin 10.3  L


 


Indirect Bilirubin 0.4  


 


Lymphocytes # 0.3  L


 


Lymphocytes % 5.1  L


 


Magnesium Level 1.6  L


 


Mean Corpuscular Hemoglobin 28.2  L


 


Mean Corpuscular Hemoglobin


Concent 32.6  


 


 


Mean Corpuscular Volume 86.7  


 


Mean Platelet Volume 9.0  


 


Monocytes # 0.6  


 


Monocytes % 12.2  H


 


Neutrophils # 4.1  


 


Neutrophils % 82.6  H


 


Nucleated Red Blood Cells # 0.0  


 


Nucleated Red Blood Cells % 0.0  


 


Phosphorus Level 3.1  


 


Platelet Count 216  #


 


Potassium Level 4.0  


 


Red Blood Count 3.66  L


 


Red Cell Distribution Width 23.8  H


 


Sodium Level 139  


 


Total Bilirubin 0.4  


 


Total Protein 5.4  L


 


White Blood Count 5.0  #











Medications


Medications





 Current Medications


Nystatin (Nystatin Susp) 5 ml QID PO  Last administered on 1/4/17at 13:53; 

Admin Dose 5 ML;  Start 12/29/16 at 23:00


Chlorhexidine Gluconate (Peridex) 15 ml Q12 MT  Last administered on 1/4/17at 08

:43; Admin Dose 15 ML;  Start 12/30/16 at 09:00


Ondansetron HCl (Zofran Inj) 4 mg Q6H  PRN IV NAUSEA AND/OR VOMITING;  Start 12/ 29/16 at 23:30


Pantoprazole 40 mg 40 mg BID@06,18 IV  Last administered on 1/4/17at 05:55; 

Admin Dose 40 MG;  Start 12/30/16 at 18:00


Cefepime HCl (Maxipime 2gm/50 ml (Pmx)) 50 ml @  100 mls/hr Q8 IVPB  Last 

administered on 1/4/17at 13:53; Admin Dose 100 MLS/HR;  Start 12/30/16 at 14:00


Scopolamine (Transderm-Scop) 1 patch Q72H TRANSDERM  Last administered on 1/3/

17at 11:47; Admin Dose 1 PATCH;  Start 12/31/16 at 11:30


Bisacodyl 10 mg 10 mg DAILY  PRN [place] CONSTIPATION Last administered on 1/2/17at 

12:58; Admin Dose 10 MG;  Start 1/2/17 at 12:30


Potassium Chloride/Dextrose/ Sodium Chloride (KCl/D5-NS) 1,000 ml @  75 mls/hr 

[id number] IV  Last administered on 1/4/17at 10:44; Admin Dose 75 MLS/HR;  Start 1/

3/17 at 19:30











NASH MCRAE MD Jan 4, 2017 15:05

## 2017-01-04 NOTE — PN
Date/Time of Note


Date/Time of Note


DATE: 1/4/17 


TIME: 19:18





Assessment/Plan


VTE Prophylaxis


VTE Prophylaxis Intervention:  SCD's





Lines/Catheters


IV Catheter Type (from [facility]):  Peripheral IV


Urinary Cath still in place:  No





Assessment/Plan


Assessment/Plan


IMPRESSION


1. Severe Dysphagia 2/2 #2


2. 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


3. Inflammation and  ulceration of oral mucosa likely associated with radiation 

with likely candida superinfection


4. 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 

radiation


5. Pancytopenia with initial severe leucopenia likely 2/2 Chemo: resolved. s/p 

Neupogen


6. Diffuse chronic skin rash associated with chronic itching  r/o psoriasis


7. Prev alcohol abuse


8. Partially calcified mass R jugular foramen, ?mets





PLAN:


Cont IVF / nystatin swish / abx mouthwash / pain control / antiemetics  and 

frequent suctioning


pt is now s/p PEG tube placement. appreciate GI


WBC today 12 and . d/c Neupogen


antipruritic / steroid cream PRN for skin itching 


Cont scopolamine patch to help with secretion





Prophylaxis: IV PPI / SCDs.





Exam/Review of Systems


Vital Signs


Vitals





 Vital Signs








  Date Time  Temp Pulse Resp B/P Pulse Ox O2 Delivery O2 Flow Rate FiO2


 


1/4/17 07:56 97.5 88 16 92/58 98   


 


1/3/17 21:21      Nasal Cannula 2 


 


1/3/17 18:29        97














 Intake and Output   


 


 1/3/17 1/3/17 1/4/17





 15:00 23:00 07:00


 


Intake Total 50 ml 750 ml 800 ml


 


Output Total  600 ml 600 ml


 


Balance 50 ml 150 ml 200 ml











Exam


Constitutional:  alert, oriented, No distress


Head:  No atraumatic, No normocephalic 


HEENT   PERRL, nl conjunctiva, +dry, cracked and peeling lips with ulcerated 

and inflamed oral mucosa as well as with whitish coating diffusely extending to 

posterior pharynx


Respiratory:  + crackles, diminished breath sounds


Cardiovascular:  regular rate and rhythm, 


Gastrointestinal:  bowel sounds, non-tender, soft


Extremities:  No edema


Skin:  eythematous scaly rash or lesions 


Lymph:  enlarged





Results


Result Diagram:  


1/4/17 1342                                                                    

            1/4/17 1342





Results 24 hrs





Laboratory Tests








Test


  1/4/17


13:42


 


Alanine Aminotransferase


(ALT/SGPT) 30  


 


 


Albumin 2.5  L


 


Albumin/Globulin Ratio 0.86  


 


Alkaline Phosphatase 49  


 


Anion Gap 12  


 


Aspartate Amino Transf


(AST/SGOT) 18  


 


 


Basophils # 0.0  


 


Basophils % 0.0  


 


Blood Morphology Comment   


 


Blood Urea Nitrogen 10  


 


Calcium Level 8.1  L


 


Carbon Dioxide Level 28  


 


Chloride Level 103  


 


Creatinine 0.50  L


 


Direct Bilirubin 0.00  


 


Eosinophils # 0.0  


 


Eosinophils % 0.1  


 


Globulin 2.90  


 


Glucose Level 153  


 


Hematocrit 31.7  L


 


Hemoglobin 10.3  L


 


Indirect Bilirubin 0.4  


 


Lymphocytes # 0.3  L


 


Lymphocytes % 5.1  L


 


Magnesium Level 1.6  L


 


Mean Corpuscular Hemoglobin 28.2  L


 


Mean Corpuscular Hemoglobin


Concent 32.6  


 


 


Mean Corpuscular Volume 86.7  


 


Mean Platelet Volume 9.0  


 


Monocytes # 0.6  


 


Monocytes % 12.2  H


 


Neutrophils # 4.1  


 


Neutrophils % 82.6  H


 


Nucleated Red Blood Cells # 0.0  


 


Nucleated Red Blood Cells % 0.0  


 


Phosphorus Level 3.1  


 


Platelet Count 216  #


 


Potassium Level 4.0  


 


Red Blood Count 3.66  L


 


Red Cell Distribution Width 23.8  H


 


Sodium Level 139  


 


Total Bilirubin 0.4  


 


Total Protein 5.4  L


 


White Blood Count 5.0  #











Medications


Medications





 Current Medications


Nystatin (Nystatin Susp) 5 ml QID PO  Last administered on 1/4/17at 13:53; 

Admin Dose 5 ML;  Start 12/29/16 at 23:00


Chlorhexidine Gluconate (Peridex) 15 ml Q12 MT  Last administered on 1/4/17at 08

:43; Admin Dose 15 ML;  Start 12/30/16 at 09:00


Ondansetron HCl (Zofran Inj) 4 mg Q6H  PRN IV NAUSEA AND/OR VOMITING;  Start 12/ 29/16 at 23:30


Pantoprazole 40 mg 40 mg BID@06,18 IV  Last administered on 1/4/17at 18:44; 

Admin Dose 40 MG;  Start 12/30/16 at 18:00


Cefepime HCl (Maxipime 2gm/50 ml (Pmx)) 50 ml @  100 mls/hr Q8 IVPB  Last 

administered on 1/4/17at 13:53; Admin Dose 100 MLS/HR;  Start 12/30/16 at 14:00


Scopolamine (Transderm-Scop) 1 patch Q72H TRANSDERM  Last administered on 1/3/

17at 11:47; Admin Dose 1 PATCH;  Start 12/31/16 at 11:30


Bisacodyl 10 mg 10 mg DAILY  PRN [place] CONSTIPATION Last administered on 1/2/17at 

12:58; Admin Dose 10 MG;  Start 1/2/17 at 12:30


Potassium Chloride/Dextrose/ Sodium Chloride (KCl/D5-NS) 1,000 ml @  75 mls/hr 

[id number] IV  Last administered on 1/4/17at 10:44; Admin Dose 75 MLS/HR;  Start 1/

3/17 at 19:30











JARVIS PHELPS MD Jan 4, 2017 19:18

## 2017-01-04 NOTE — CONS
Date/Time of Note


Date/Time of Note


DATE: 17 


TIME: 18:32





Assessment/Plan


Assessment/Plan


Additional Assessment/Plan


Assessment:


* Dysphagia secondary to radiation


 * s/p EGD + PE-3-17


 * 1.  Severe radiation injury to the posterior pharynx and upper esophagus.


 * 2.  Mild distal esophagitis.


 * 3.  Post an uneventful percutaneous endoscopic gastrostomy tube placement 

with placement of [place] 20 gastrostomy tube.


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


* Parotid cancer


* History of alcohol abuse





Plan:


* Monitor residual every 6 hours, hold tube feed for residual greater than 150 

mL


* Secure G-tube with abdominal binder


* Oncology following


* GI sign off


* Further recommendations depend on clinical course


* Patient seen in collaboration with Dr. Ro





Consultation Date/Type/Reason


Admit Date/Time


Dec 29, 2016 at 20:57


Initial Consult Date


16


Type of Consultation:  Gastroenterology


Reason for Consultation


Dysphagia


Referring Provider:  TIM MACK





24 HR Interval Summary


Free Text/Dictation


G-tube at 50 mL/h


Minimal residual noted


GI sign off





Exam/Review of Systems


Vital Signs


Vitals





 Vital Signs








  Date Time  Temp Pulse Resp B/P Pulse Ox O2 Delivery O2 Flow Rate FiO2


 


17 07:56 97.5 88 16 92/58 98   


 


1/3/17 21:21      Nasal Cannula 2 


 


1/3/17 18:29        97














 Intake and Output   


 


 1/3/17 1/3/17 1/4/17





 14:59 22:59 06:59


 


Intake Total 50 ml 750 ml 800 ml


 


Output Total  600 ml 600 ml


 


Balance 50 ml 150 ml 200 ml











Exam


Constitutional:  alert, oriented, well developed


Psych:  nl mood/affect, no complaints


Head:  atraumatic, normocephalic


Eyes:  EOMI, PERRL, nl conjunctiva, nl lids, nl sclera


ENMT:  nl external ears & nose, nl lips & teeth, nl nasal mucosa & septum


Neck:  non-tender, other (Radiation injury), supple


Respiratory:  clear to auscultation, normal air movement


Cardiovascular:  nl pulses, regular rate and rhythm


Gastrointestinal:  nl liver, spleen, non-tender, soft


Musculoskeletal:  nl extremities to inspection, nl gait and stance


Extremities:  normal pulses


Neurological:  CNS II-XII intact, nl mental status, nl speech, nl strength


Skin:  nl turgor, 


   No rash or lesions


Lymph:  nl lymph nodes





Results


Result Diagram:  


17 1342                                                                    

            17 1342





Results 24 hrs





Laboratory Tests








Test


  17


13:42


 


Alanine Aminotransferase


(ALT/SGPT) 30  


 


 


Albumin 2.5  L


 


Albumin/Globulin Ratio 0.86  


 


Alkaline Phosphatase 49  


 


Anion Gap 12  


 


Aspartate Amino Transf


(AST/SGOT) 18  


 


 


Basophils # 0.0  


 


Basophils % 0.0  


 


Blood Morphology Comment   


 


Blood Urea Nitrogen 10  


 


Calcium Level 8.1  L


 


Carbon Dioxide Level 28  


 


Chloride Level 103  


 


Creatinine 0.50  L


 


Direct Bilirubin 0.00  


 


Eosinophils # 0.0  


 


Eosinophils % 0.1  


 


Globulin 2.90  


 


Glucose Level 153  


 


Hematocrit 31.7  L


 


Hemoglobin 10.3  L


 


Indirect Bilirubin 0.4  


 


Lymphocytes # 0.3  L


 


Lymphocytes % 5.1  L


 


Magnesium Level 1.6  L


 


Mean Corpuscular Hemoglobin 28.2  L


 


Mean Corpuscular Hemoglobin


Concent 32.6  


 


 


Mean Corpuscular Volume 86.7  


 


Mean Platelet Volume 9.0  


 


Monocytes # 0.6  


 


Monocytes % 12.2  H


 


Neutrophils # 4.1  


 


Neutrophils % 82.6  H


 


Nucleated Red Blood Cells # 0.0  


 


Nucleated Red Blood Cells % 0.0  


 


Phosphorus Level 3.1  


 


Platelet Count 216  #


 


Potassium Level 4.0  


 


Red Blood Count 3.66  L


 


Red Cell Distribution Width 23.8  H


 


Sodium Level 139  


 


Total Bilirubin 0.4  


 


Total Protein 5.4  L


 


White Blood Count 5.0  #











Medications


Medications





 Current Medications


Nystatin (Nystatin Susp) 5 ml QID PO  Last administered on  13:53; 

Admin Dose 5 ML;  Start 16 at 23:00


Chlorhexidine Gluconate (Peridex) 15 ml Q12 MT  Last administered on  08

:43; Admin Dose 15 ML;  Start 16 at 09:00


Ondansetron HCl (Zofran Inj) 4 mg Q6H  PRN IV NAUSEA AND/OR VOMITING;  Start  at 23:30


Pantoprazole 40 mg 40 mg BID@06,18 IV  Last administered on  05:55; 

Admin Dose 40 MG;  Start 16 at 18:00


Cefepime HCl (Maxipime 2gm/50 ml (Pmx)) 50 ml @  100 mls/hr Q8 IVPB  Last 

administered on  13:53; Admin Dose 100 MLS/HR;  Start 16 at 14:00


Scopolamine (Transderm-Scop) 1 patch Q72H TRANSDERM  Last administered on 1/3/

17at 11:47; Admin Dose 1 PATCH;  Start 16 at 11:30


Bisacodyl 10 mg 10 mg DAILY  PRN [place] CONSTIPATION Last administered on  

12:58; Admin Dose 10 MG;  Start 17 at 12:30


Potassium Chloride/Dextrose/ Sodium Chloride (KCl/D5-NS) 1,000 ml @  75 mls/hr 

[id number] IV  Last administered on  10:44; Admin Dose 75 MLS/HR;  Start 1/

3/17 at 19:30











VANI GARCIA 2017 18:37

## 2017-01-05 NOTE — CONS
Date/Time of Note


Date/Time of Note


DATE: 1/5/17 


TIME: 15:33





Assessment/Plan


Assessment/Plan


Chief Complaint/Hosp Course


The patient is a 81 year old male who is currently undergoing radiation therapy 

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  He received 

neoadjuvant carbo/taxol with good response x 3 cycles, starting in September 2016 by Dr. Baker, and has now received 4 weeks of radiation therapy, last 

12/27/16 with Dr. Eugenio Bryson, along with continued platinum/taxane 

chemotherapy, last 2 weeks ago per Dr. Baker. He has been having worsening 

dysphagia over the last couple of months and now it's so severe that he cannot 

even tolerate liquids without coughing. He was advised to go to the ER for 

evaluation by Dr. Bryson's covering partner, Dr. Rica Aguirre, for dehydration

, and was admitted for severe dysphagia and found to have neutropenic fever.





Problems:  


(1) Neutropenia


Status:  Acute


Qualifiers:  


   Neutropenia type:  unspecified  Qualified Code:  D70.9 - Neutropenia, 

unspecified type


(2) Radiation esophagitis


Status:  Acute


Additional Assessment/Plan


- Neutropenia related to chemotherapy, last approximately 2 weeks ago per Dr. Bakre. This has resolved. Neupogen has been discontinued


- Blood and Urine cultures are negative. Although CXR does demonstrate LLL 

pneumonia. Pt is is on Cefepime which he can continue. Would also consider ID 

consult if patient does not improve.  Upon discharge, can switch to levaquin (

per pharmacy, can crush and give via PEG) to complete 7 day course from the 

time of clinical and count recovery (until approx 1/8/17).


- Transfuse Hgb < 8, plt < 10 or if bleeding


- s/p G tube for severe dysphagia 1/3/17


- Continue nystatin swish and pain control per primary team


- Case management working on SNF placement per patient request for nursing and 

social support, tube feedings, etc., though may be difficult with daily 

radiation requirements and chemotherapy needs.


- Pt may resume radiation tomorrow after g tube placement though patient 

hesitant to do so - will discuss with Dr. Bryson though patient encouraged to 

complete course if able


Problems:  





Consultation Date/Type/Reason


Admit Date/Time


Dec 29, 2016 at 20:57


Initial Consult Date


12/30/16


Type of Consultation:  Hematology/Oncology


Referring Provider:  TIM MACK





24 HR Interval Summary


Free Text/Dictation


Patient states that he still has difficulty swallowing but is able to speak; he 

is pleased now that he has some nutrition through the feeding tube.  He is 

unsure whether he can withstand more radiation.





Exam/Review of Systems


Vital Signs


Vitals





 Vital Signs








  Date Time  Temp Pulse Resp B/P Pulse Ox O2 Delivery O2 Flow Rate FiO2


 


1/5/17 08:01 98.2 87 18 93/55 98   


 


1/3/17 21:21      Nasal Cannula 2 


 


1/3/17 18:29        97














 Intake and Output   


 


 1/4/17 1/4/17 1/5/17





 15:00 23:00 07:00


 


Intake Total  0 ml 1645 ml


 


Output Total  800 ml 900 ml


 


Balance  -800 ml 745 ml











Exam


Constitutional:  alert, frail, oriented


Psych:  anxiety, depression


Head:  normocephalic


Eyes:  nl conjunctiva


ENMT:  other (radiation hyperpigmentation over his face)


Neck:  non-tender, supple


Respiratory:  clear to auscultation, normal air movement


Cardiovascular:  regular rate and rhythm


Gastrointestinal:  soft


Musculoskeletal:  nl extremities to inspection, nl gait and stance


Extremities:  normal pulses





Results


Result Diagram:  


1/4/17 1342                                                                    

            1/4/17 1342








Medications


Medications





 Current Medications


Nystatin (Nystatin Susp) 5 ml QID PO  Last administered on 1/5/17at 13:20; 

Admin Dose 5 ML;  Start 12/29/16 at 23:00


Chlorhexidine Gluconate (Peridex) 15 ml Q12 MT  Last administered on 1/5/17at 09

:42; Admin Dose 15 ML;  Start 12/30/16 at 09:00


Ondansetron HCl (Zofran Inj) 4 mg Q6H  PRN IV NAUSEA AND/OR VOMITING;  Start 12/ 29/16 at 23:30


Pantoprazole 40 mg 40 mg BID@06,18 IV  Last administered on 1/5/17at 05:30; 

Admin Dose 40 MG;  Start 12/30/16 at 18:00


Cefepime HCl (Maxipime 2gm/50 ml (Pmx)) 50 ml @  100 mls/hr Q8 IVPB  Last 

administered on 1/5/17at 13:23; Admin Dose 100 MLS/HR;  Start 12/30/16 at 14:00


Scopolamine (Transderm-Scop) 1 patch Q72H TRANSDERM  Last administered on 1/3/

17at 11:47; Admin Dose 1 PATCH;  Start 12/31/16 at 11:30


Bisacodyl 10 mg 10 mg DAILY  PRN [place] CONSTIPATION Last administered on 1/2/17at 

12:58; Admin Dose 10 MG;  Start 1/2/17 at 12:30


Potassium Chloride/Dextrose/ Sodium Chloride (KCl/D5-NS) 1,000 ml @  75 mls/hr 

[id number] IV  Last administered on 1/5/17at 04:30; Admin Dose 75 MLS/HR;  Start 1/

3/17 at 19:30











NASH MCRAE MD Jan 5, 2017 15:35

## 2017-01-05 NOTE — PN
Date/Time of Note


Date/Time of Note


DATE: 1/5/17 


TIME: 18:11





Assessment/Plan


VTE Prophylaxis


VTE Prophylaxis Intervention:  SCD's





Lines/Catheters


IV Catheter Type (from [facility]):  Peripheral IV


Urinary Cath still in place:  No





Assessment/Plan


Assessment/Plan


1. Severe Dysphagia 2/2 #2


2. 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


3. Inflammation and  ulceration of oral mucosa likely associated with radiation 

with likely candida superinfection


4. 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 

radiation


5. Pancytopenia with initial severe leucopenia likely 2/2 Chemo: resolved. s/p 

Neupogen


6. Diffuse chronic skin rash associated with chronic itching  r/o psoriasis


7. Prev alcohol abuse


8. Mild Hypomagnesemia





PLAN:


pt is now s/p PEG tube placement. appreciate GI


Cont nystatin swish / abx mouthwash / pain control / antiemetics  and frequent 

suctioning


antipruritic / steroid cream PRN for skin itching 


Cont scopolamine patch to help with secretion as needed


replete electrolytes as needed





Prophylaxis: IV PPI / SCDs.





DISP: pending placement





Subjective


24 Hr Interval Summary


Free Text/Dictation


feeling better, but still unable to take po





Exam/Review of Systems


Vital Signs


Vitals





 Vital Signs








  Date Time  Temp Pulse Resp B/P Pulse Ox O2 Delivery O2 Flow Rate FiO2


 


1/5/17 09:48     21   


 


1/5/17 09:48  98 18     21


 


1/5/17 08:01 98.2   93/55    


 


1/3/17 21:21      Nasal Cannula 2 














 Intake and Output   


 


 1/4/17 1/4/17 1/5/17





 15:00 23:00 07:00


 


Intake Total  0 ml 1645 ml


 


Output Total  800 ml 900 ml


 


Balance  -800 ml 745 ml











Exam


Constitutional:  alert, oriented, No distress


Head:  No atraumatic, No normocephalic 


HEENT   PERRL, nl conjunctiva, +dry, cracked and peeling lips with ulcerated 

and inflamed oral mucosa as well as with whitish coating diffusely extending to 

posterior pharynx


Respiratory:  + crackles, diminished breath sounds


Cardiovascular:  regular rate and rhythm, 


Gastrointestinal:  bowel sounds, non-tender, soft


Extremities:  No edema


Skin:  eythematous scaly rash or lesions 


Lymph:  enlarged





Results


Result Diagram:  


1/4/17 1342                                                                    

            1/4/17 1342








Medications


Medications





 Current Medications


Nystatin (Nystatin Susp) 5 ml QID PO  Last administered on 1/5/17at 17:14; 

Admin Dose 5 ML;  Start 12/29/16 at 23:00


Chlorhexidine Gluconate (Peridex) 15 ml Q12 MT  Last administered on 1/5/17at 09

:42; Admin Dose 15 ML;  Start 12/30/16 at 09:00


Ondansetron HCl (Zofran Inj) 4 mg Q6H  PRN IV NAUSEA AND/OR VOMITING;  Start 12/ 29/16 at 23:30


Pantoprazole 40 mg 40 mg BID@06,18 IV  Last administered on 1/5/17at 17:18; 

Admin Dose 40 MG;  Start 12/30/16 at 18:00


Cefepime HCl (Maxipime 2gm/50 ml (Pmx)) 50 ml @  100 mls/hr Q8 IVPB  Last 

administered on 1/5/17at 13:23; Admin Dose 100 MLS/HR;  Start 12/30/16 at 14:00


Scopolamine (Transderm-Scop) 1 patch Q72H TRANSDERM  Last administered on 1/3/

17at 11:47; Admin Dose 1 PATCH;  Start 12/31/16 at 11:30


Bisacodyl 10 mg 10 mg DAILY  PRN [place] CONSTIPATION Last administered on 1/2/17at 

12:58; Admin Dose 10 MG;  Start 1/2/17 at 12:30


Potassium Chloride/Dextrose/ Sodium Chloride (KCl/D5-NS) 1,000 ml @  75 mls/hr 

[id number] IV  Last administered on 1/5/17at 17:14; Admin Dose 75 MLS/HR;  Start 1/

3/17 at 19:30











JARVIS PHELPS MD Jan 5, 2017 18:16

## 2017-01-06 NOTE — PN
Date/Time of Note


Date/Time of Note


DATE: 1/6/17 


TIME: 23:21





Assessment/Plan


VTE Prophylaxis


VTE Prophylaxis Intervention:  SCD's





Lines/Catheters


IV Catheter Type (from [facility]):  Peripheral IV


Urinary Cath still in place:  No





Assessment/Plan


Assessment/Plan


1. Severe Dysphagia 2/2 #2


2. 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


3. Inflammation and  ulceration of oral mucosa likely associated with radiation 

with likely candida superinfection


4. 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 

radiation


5. Pancytopenia with initial severe leucopenia likely 2/2 Chemo: resolved. s/p 

Neupogen


6. Diffuse chronic skin rash associated with chronic itching  r/o psoriasis


7. Prev alcohol abuse


8. Mild Hypomagnesemia





PLAN:


pt is now s/p PEG tube placement. appreciate GI


Cont nystatin swish / abx mouthwash / pain control / antiemetics  and frequent 

suctioning


antipruritic / steroid cream PRN for skin itching 


Cont scopolamine patch to help with secretion as needed


replete electrolytes as needed





Prophylaxis: IV PPI / SCDs.





DISP: pending placement





Subjective


24 Hr Interval Summary


Free Text/Dictation


stated now able to swallow with less pain





Exam/Review of Systems


Vital Signs


Vitals





 Vital Signs








  Date Time  Temp Pulse Resp B/P Pulse Ox O2 Delivery O2 Flow Rate FiO2


 


1/6/17 20:41      Nasal Cannula 1 


 


1/6/17 19:56 98.3 59 20 123/61 100   


 


1/5/17 09:48        21














 Intake and Output   


 


 1/5/17 1/5/17 1/6/17





 15:00 23:00 07:00


 


Intake Total  950 ml 1450 ml


 


Output Total  550 ml 850 ml


 


Balance  400 ml 600 ml











Exam


Constitutional:  alert, oriented, No distress


Head:  No atraumatic, No normocephalic 


HEENT   PERRL, nl conjunctiva, +dry, cracked and peeling lips with ulcerated 

and inflamed oral mucosa as well as with whitish coating diffusely extending to 

posterior pharynx


Respiratory:  + crackles, diminished breath sounds


Cardiovascular:  regular rate and rhythm, 


Gastrointestinal:  bowel sounds, non-tender, soft


Extremities:  No edema


Skin:  eythematous scaly rash or lesions 


Lymph:  enlarged





Results


Result Diagram:  


1/6/17 0425                                                                    

            1/6/17 0425





Results 24 hrs





Laboratory Tests








Test


  1/6/17


04:25


 


Alanine Aminotransferase


(ALT/SGPT) 32  


 


 


Albumin 2.3  L


 


Albumin/Globulin Ratio 0.79  


 


Alkaline Phosphatase 48  


 


Anion Gap 10  


 


Aspartate Amino Transf


(AST/SGOT) 21  


 


 


Band Neutrophils % 10.0  H


 


Blood Morphology Comment   


 


Blood Urea Nitrogen 11  


 


Calcium Level 7.6  L


 


Carbon Dioxide Level 31  


 


Chloride Level 102  


 


Creatinine 0.48  L


 


Differential Comment MANUAL DIFF  


 


Direct Bilirubin 0.00  


 


Globulin 2.90  


 


Glucose Level 129  


 


Hematocrit 28.0  L


 


Hemoglobin 9.4  L


 


Indirect Bilirubin 0.2  


 


Lymphocytes # 0.4  L


 


Lymphocytes % 9.0  L


 


Mean Corpuscular Hemoglobin 28.9  L


 


Mean Corpuscular Hemoglobin


Concent 33.5  


 


 


Mean Corpuscular Volume 86.2  


 


Mean Platelet Volume 9.1  


 


Monocytes # 0.4  


 


Monocytes % 10.0  


 


Neutrophils # 3.1  


 


Neutrophils % 71.0  


 


Platelet Count 202  


 


Potassium Level 4.2  


 


Red Blood Count 3.25  L


 


Red Cell Distribution Width 24.0  H


 


Sodium Level 139  


 


Total Bilirubin 0.2  


 


Total Protein 5.2  L


 


White Blood Count 4.3  L











Medications


Medications





 Current Medications


Nystatin (Nystatin Susp) 5 ml QID PO  Last administered on 1/6/17at 21:17; 

Admin Dose 5 ML;  Start 12/29/16 at 23:00


Chlorhexidine Gluconate (Peridex) 15 ml Q12 MT  Last administered on 1/6/17at 21

:17; Admin Dose 15 ML;  Start 12/30/16 at 09:00


Ondansetron HCl (Zofran Inj) 4 mg Q6H  PRN IV NAUSEA AND/OR VOMITING;  Start 12/ 29/16 at 23:30


Pantoprazole 40 mg 40 mg BID@06,18 IV  Last administered on 1/6/17at 05:57; 

Admin Dose 40 MG;  Start 12/30/16 at 18:00


Cefepime HCl (Maxipime 2gm/50 ml (Pmx)) 50 ml @  100 mls/hr Q8 IVPB  Last 

administered on 1/6/17at 21:17; Admin Dose 100 MLS/HR;  Start 12/30/16 at 14:00


Scopolamine (Transderm-Scop) 1 patch Q72H TRANSDERM  Last administered on 1/6/ 17at 12:27; Admin Dose 1 PATCH;  Start 12/31/16 at 11:30


Bisacodyl 10 mg 10 mg DAILY  PRN [place] CONSTIPATION Last administered on 1/2/17at 

12:58; Admin Dose 10 MG;  Start 1/2/17 at 12:30


Potassium Chloride/Dextrose/ Sodium Chloride (KCl/D5-NS) 1,000 ml @  75 mls/hr 

[id number] IV  Last administered on 1/6/17at 12:27; Admin Dose 75 MLS/HR;  Start 1/

3/17 at 19:30











JARVIS PHELPS MD Jan 6, 2017 23:23

## 2017-01-06 NOTE — CONS
Date/Time of Note


Date/Time of Note


DATE: 1/6/17 


TIME: 16:16





Assessment/Plan


Assessment/Plan


Chief Complaint/Hosp Course


The patient is a 81 year old male who is currently undergoing radiation therapy 

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  He received 

neoadjuvant carbo/taxol with good response x 3 cycles, starting in September 2016 by Dr. Baker, and has now received 4 weeks of radiation therapy, last 

12/27/16 with Dr. Eugenio Bryson, along with continued platinum/taxane 

chemotherapy, last 2 weeks ago per Dr. Baker. He has been having worsening 

dysphagia over the last couple of months and now it's so severe that he cannot 

even tolerate liquids without coughing. He was advised to go to the ER for 

evaluation by Dr. Bryson's covering partner, Dr. Rica Aguirre, for dehydration

, and was admitted for severe dysphagia and found to have neutropenic fever.





Problems:  


(1) Neutropenia


Status:  Acute


Qualifiers:  


   Neutropenia type:  unspecified  Qualified Code:  D70.9 - Neutropenia, 

unspecified type


(2) Radiation esophagitis


Status:  Acute


Additional Assessment/Plan


- Neutropenia related to chemotherapy, last approximately 2 weeks ago per Dr. Baker. This has resolved. Neupogen has been discontinued


- Blood and Urine cultures are negative. Although CXR does demonstrate LLL 

pneumonia. Pt is is on Cefepime which he can continue. Would also consider ID 

consult if patient does not improve.  


- Transfuse Hgb < 8, plt < 10 or if bleeding


- s/p G tube for severe dysphagia 1/3/17


- Continue nystatin swish and pain control per primary team


- Case management working on SNF placement per patient request for nursing and 

social support, tube feedings, etc., though may be difficult with daily 

radiation requirements and chemotherapy needs.


- Pt may resume radiation tomorrow after g tube placement though patient 

hesitant to do so - Dr. Bryson will come to discuss with patient


Problems:  





Consultation Date/Type/Reason


Admit Date/Time


Dec 29, 2016 at 20:57


Initial Consult Date


12/30/16


Type of Consultation:  Hematology/Oncology


Referring Provider:  TIM MACK





24 HR Interval Summary


Free Text/Dictation


Patient states that he no longer has throat pain and is able to swallow now.





Exam/Review of Systems


Vital Signs


Vitals





 Vital Signs








  Date Time  Temp Pulse Resp B/P Pulse Ox O2 Delivery O2 Flow Rate FiO2


 


1/6/17 08:33 97.6 66 17 93/56 98   


 


1/5/17 20:39      Nasal Cannula 1 


 


1/5/17 09:48        21














 Intake and Output   


 


 1/5/17 1/5/17 1/6/17





 15:00 23:00 07:00


 


Intake Total  950 ml 1450 ml


 


Output Total  550 ml 850 ml


 


Balance  400 ml 600 ml











Exam


Constitutional:  alert, frail, oriented


Psych:  anxiety, depression


Head:  normocephalic


Eyes:  nl conjunctiva


ENMT:  other (radiation hyperpigmentation over his face)


Neck:  non-tender, supple


Respiratory:  clear to auscultation, normal air movement


Cardiovascular:  regular rate and rhythm


Gastrointestinal:  soft


Musculoskeletal:  nl extremities to inspection, nl gait and stance


Extremities:  normal pulses





Results


Result Diagram:  


1/6/17 0425                                                                    

            1/6/17 0425





Results 24 hrs





Laboratory Tests








Test


  1/6/17


04:25


 


Alanine Aminotransferase


(ALT/SGPT) 32  


 


 


Albumin 2.3  L


 


Albumin/Globulin Ratio 0.79  


 


Alkaline Phosphatase 48  


 


Anion Gap 10  


 


Aspartate Amino Transf


(AST/SGOT) 21  


 


 


Band Neutrophils % 10.0  H


 


Blood Morphology Comment   


 


Blood Urea Nitrogen 11  


 


Calcium Level 7.6  L


 


Carbon Dioxide Level 31  


 


Chloride Level 102  


 


Creatinine 0.48  L


 


Differential Comment MANUAL DIFF  


 


Direct Bilirubin 0.00  


 


Globulin 2.90  


 


Glucose Level 129  


 


Hematocrit 28.0  L


 


Hemoglobin 9.4  L


 


Indirect Bilirubin 0.2  


 


Lymphocytes # 0.4  L


 


Lymphocytes % 9.0  L


 


Mean Corpuscular Hemoglobin 28.9  L


 


Mean Corpuscular Hemoglobin


Concent 33.5  


 


 


Mean Corpuscular Volume 86.2  


 


Mean Platelet Volume 9.1  


 


Monocytes # 0.4  


 


Monocytes % 10.0  


 


Neutrophils # 3.1  


 


Neutrophils % 71.0  


 


Platelet Count 202  


 


Potassium Level 4.2  


 


Red Blood Count 3.25  L


 


Red Cell Distribution Width 24.0  H


 


Sodium Level 139  


 


Total Bilirubin 0.2  


 


Total Protein 5.2  L


 


White Blood Count 4.3  L











Medications


Medications





 Current Medications


Nystatin (Nystatin Susp) 5 ml QID PO  Last administered on 1/6/17at 12:27; 

Admin Dose 5 ML;  Start 12/29/16 at 23:00


Chlorhexidine Gluconate (Peridex) 15 ml Q12 MT  Last administered on 1/6/17at 09

:01; Admin Dose 15 ML;  Start 12/30/16 at 09:00


Ondansetron HCl (Zofran Inj) 4 mg Q6H  PRN IV NAUSEA AND/OR VOMITING;  Start 12/ 29/16 at 23:30


Pantoprazole 40 mg 40 mg BID@06,18 IV  Last administered on 1/6/17at 05:57; 

Admin Dose 40 MG;  Start 12/30/16 at 18:00


Cefepime HCl (Maxipime 2gm/50 ml (Pmx)) 50 ml @  100 mls/hr Q8 IVPB  Last 

administered on 1/6/17at 14:58; Admin Dose 100 MLS/HR;  Start 12/30/16 at 14:00


Scopolamine (Transderm-Scop) 1 patch Q72H TRANSDERM  Last administered on 1/6/ 17at 12:27; Admin Dose 1 PATCH;  Start 12/31/16 at 11:30


Bisacodyl 10 mg 10 mg DAILY  PRN [place] CONSTIPATION Last administered on 1/2/17at 

12:58; Admin Dose 10 MG;  Start 1/2/17 at 12:30


Potassium Chloride/Dextrose/ Sodium Chloride (KCl/D5-NS) 1,000 ml @  75 mls/hr 

[id number] IV  Last administered on 1/6/17at 12:27; Admin Dose 75 MLS/HR;  Start 1/

3/17 at 19:30











NASH MCRAE MD Jan 6, 2017 16:17

## 2017-01-07 NOTE — PN
Date/Time of Note


Date/Time of Note


DATE: 1/7/17 


TIME: 11:31





Assessment/Plan


VTE Prophylaxis


VTE Prophylaxis Intervention:  SCD's





Lines/Catheters


IV Catheter Type (from Nrs):  Peripheral IV


Urinary Cath still in place:  No





Assessment/Plan


Assessment/Plan


1. Severe Dysphagia 2/2 #2


2. 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


3. Inflammation and  ulceration of oral mucosa likely associated with radiation 

with likely candida superinfection


4. 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 

radiation


5. Pancytopenia with initial severe leucopenia likely 2/2 Chemo: resolved. s/p 

Neupogen


6. Diffuse chronic skin rash associated with chronic itching  r/o psoriasis


7. Prev alcohol abuse


8. Mild Hypomagnesemia





PLAN:


pt is now s/p PEG tube placement. appreciate GI


Cont nystatin swish / abx mouthwash / pain control / antiemetics  and frequent 

suctioning


antipruritic / steroid cream PRN for skin itching 


Cont scopolamine patch to help with secretion as needed


replete electrolytes as needed





Prophylaxis: IV PPI / SCDs.





DISP: pending placement





Subjective


24 Hr Interval Summary


Free Text/Dictation


he said he is now able to swallow liquid with only minimal pain





Exam/Review of Systems


Vital Signs


Vitals





 Vital Signs








  Date Time  Temp Pulse Resp B/P Pulse Ox O2 Delivery O2 Flow Rate FiO2


 


1/7/17 08:34 97.9 54 18 106/57 98   


 


1/6/17 20:41      Nasal Cannula 1 


 


1/5/17 09:48        21














 Intake and Output   


 


 1/6/17 1/6/17 1/7/17





 15:00 23:00 07:00


 


Intake Total  0 ml 1400 ml


 


Output Total  800 ml 1200 ml


 


Balance  -800 ml 200 ml











Exam


Constitutional:  alert, oriented, No distress


Head:  No atraumatic, No normocephalic 


HEENT   PERRL, nl conjunctiva, +dry, cracked and peeling lips with ulcerated 

and inflamed oral mucosa (improving)


Respiratory:  slightly diminished breath sounds at the bases


Cardiovascular:  regular rate and rhythm, 


Gastrointestinal:  bowel sounds, non-tender, soft


Extremities:  No edema


Skin:  eythematous scaly rash or lesions 


Lymph:  enlarged





Results


Result Diagram:  


1/6/17 0425 1/6/17 0425








Medications


Medications





 Current Medications


Nystatin (Nystatin Susp) 5 ml QID PO  Last administered on 1/7/17at 09:48; 

Admin Dose 5 ML;  Start 12/29/16 at 23:00


Chlorhexidine Gluconate (Peridex) 15 ml Q12 MT  Last administered on 1/7/17at 09

:49; Admin Dose 15 ML;  Start 12/30/16 at 09:00


Ondansetron HCl (Zofran Inj) 4 mg Q6H  PRN IV NAUSEA AND/OR VOMITING;  Start 12/ 29/16 at 23:30


Pantoprazole 40 mg 40 mg BID@06,18 IV  Last administered on 1/7/17at 05:51; 

Admin Dose 40 MG;  Start 12/30/16 at 18:00


Cefepime HCl (Maxipime 2gm/50 ml (Pmx)) 50 ml @  100 mls/hr Q8 IVPB  Last 

administered on 1/7/17at 05:51; Admin Dose 100 MLS/HR;  Start 12/30/16 at 14:00


Scopolamine (Transderm-Scop) 1 patch Q72H TRANSDERM  Last administered on 1/6/ 17at 12:27; Admin Dose 1 PATCH;  Start 12/31/16 at 11:30


Bisacodyl 10 mg 10 mg DAILY  PRN [place] CONSTIPATION Last administered on 1/2/17at 

12:58; Admin Dose 10 MG;  Start 1/2/17 at 12:30


Potassium Chloride/Dextrose/ Sodium Chloride (KCl/D5-NS) 1,000 ml @  75 mls/hr 

[id number] IV  Last administered on 1/7/17at 03:25; Admin Dose 75 MLS/HR;  Start 1/

3/17 at 19:30











JARVIS PHELPS MD Jan 7, 2017 11:34

## 2017-01-08 NOTE — PN
Date/Time of Note


Date/Time of Note


DATE: 1/8/17 


TIME: 14:08





Assessment/Plan


VTE Prophylaxis


VTE Prophylaxis Intervention:  SCD's





Lines/Catheters


IV Catheter Type (from [facility]):  Peripheral IV


Urinary Cath still in place:  No





Assessment/Plan


Assessment/Plan


1. Severe Dysphagia 2/2 #2 - continue with GI recs, pain medications - PEG tube 

feedings


2. 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


3. Inflammation and  ulceration of oral mucosa likely associated with radiation 

with likely candida superinfection - continue with antifungals


4. 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 

radiation - monitor acute changes


5. Pancytopenia with initial severe leucopenia likely 2/2 Chemo: resolved. s/p 

Neupogen - stable


6. Diffuse chronic skin rash associated with chronic itching  r/o psoriasis - 

no acute lesions


7. Prev alcohol abuse - monitor for changes


8. Mild Hypomagnesemia - replete


9. GI ppx - protonix


10. DVT ppx - scds





dispo - f/u recs, as per clinical course, f/u rad/onc consult





this progress note took greater than 40 minutes to complete





Subjective


24 Hr Interval Summary


Free Text/Dictation


Patient is doing better today. No overnight events. He has been having 

odynophagia - mild improvement noted. spoke to him at length about radiation 

induced esophagitis. He understands that he needs to complete radiation therapy 

as scheduled. 25 minutes spent.





Exam/Review of Systems


Vital Signs


Vitals





 Vital Signs








  Date Time  Temp Pulse Resp B/P Pulse Ox O2 Delivery O2 Flow Rate FiO2


 


1/8/17 08:05 98.0 52 18 126/57 94   


 


1/6/17 20:41      Nasal Cannula 1 


 


1/5/17 09:48        21














 Intake and Output   


 


 1/7/17 1/7/17 1/8/17





 15:00 23:00 07:00


 


Intake Total  1600 ml 1450 ml


 


Output Total  800 ml 1400 ml


 


Balance  800 ml 50 ml











Exam


Gen Nirali: NAD, AAOx4


HEENT: NC/AT, PERRLA, EOMI, no pharyngeal erythema, no tonsillar exudates, no 

lymphadenopathy, no JVD, no carotid bruits


NECK: discoloration noted bilaterally, left with small tumor noted, tenderness 

to palpation


THORAX: symmetrical, no obvious deformities


CV: S1S2, RRR, no M/G/R


Lungs: CTAB no W/C/R/R


Abd: soft, NT/ND, +BS, no rebound, no guarding, neg HSM, PEG site C/D/I


EXT: no edema, no ecchymosis, no clubbing, FROM


Neuro: CN II-XII grossly intact, no focal deficits


Psych: good mentation, alert and oriented, good mood and affect


Skin: see neck





Results


Result Diagram:  


1/8/17 0437                                                                    

            1/8/17 0437





Results 24 hrs





Laboratory Tests








Test


  1/8/17


04:37


 


Alanine Aminotransferase


(ALT/SGPT) 37  


 


 


Albumin 2.6  L


 


Albumin/Globulin Ratio 0.86  


 


Alkaline Phosphatase 52  


 


Anion Gap 14  


 


Aspartate Amino Transf


(AST/SGOT) 28  


 


 


Band Neutrophils % 14.0  H


 


Blood Morphology Comment   


 


Blood Urea Nitrogen 9  


 


Calcium Level 8.4  


 


Carbon Dioxide Level 31  


 


Chloride Level 98  


 


Creatinine 0.46  L


 


Direct Bilirubin 0.00  


 


Globulin 3.00  


 


Glucose Level 120  


 


Hematocrit 26.9  L


 


Hemoglobin 9.0  L


 


Indirect Bilirubin 0.2  


 


Lymphocytes # 0.2  L


 


Lymphocytes % 4.0  L


 


Mean Corpuscular Hemoglobin 29.1  


 


Mean Corpuscular Hemoglobin


Concent 33.4  


 


 


Mean Corpuscular Volume 87.1  


 


Mean Platelet Volume 8.9  


 


Monocytes # 0.3  


 


Monocytes % 6.0  


 


Neutrophils # 3.3  


 


Neutrophils % 76.0  


 


Platelet Count 214  


 


Potassium Level 4.7  


 


Red Blood Count 3.09  L


 


Red Cell Distribution Width 25.0  H


 


Sodium Level 138  


 


Total Bilirubin 0.2  


 


Total Protein 5.6  L


 


White Blood Count 4.4  L











Medications


Medications





 Current Medications


Nystatin (Nystatin Susp) 5 ml QID PO  Last administered on 1/8/17at 12:39; 

Admin Dose 5 ML;  Start 12/29/16 at 23:00


Chlorhexidine Gluconate (Peridex) 15 ml Q12 MT  Last administered on 1/8/17at 09

:08; Admin Dose 15 ML;  Start 12/30/16 at 09:00


Ondansetron HCl (Zofran Inj) 4 mg Q6H  PRN IV NAUSEA AND/OR VOMITING;  Start 12/ 29/16 at 23:30


Pantoprazole 40 mg 40 mg BID@06,18 IV  Last administered on 1/8/17at 06:13; 

Admin Dose 40 MG;  Start 12/30/16 at 18:00


Cefepime HCl (Maxipime 2gm/50 ml (Pmx)) 50 ml @  100 mls/hr Q8 IVPB  Last 

administered on 1/8/17at 13:56; Admin Dose 100 MLS/HR;  Start 12/30/16 at 14:00


Scopolamine (Transderm-Scop) 1 patch Q72H TRANSDERM  Last administered on 1/6/ 17at 12:27; Admin Dose 1 PATCH;  Start 12/31/16 at 11:30


Bisacodyl 10 mg 10 mg DAILY  PRN [place] CONSTIPATION Last administered on 1/2/17at 

12:58; Admin Dose 10 MG;  Start 1/2/17 at 12:30


Potassium Chloride/Dextrose/ Sodium Chloride (KCl/D5-NS) 1,000 ml @  75 mls/hr 

[id number] IV  Last administered on 1/8/17at 10:17; Admin Dose 75 MLS/HR;  Start 1/

3/17 at 19:30











KELLY RENTERIA MD Jan 8, 2017 14:15

## 2017-01-09 NOTE — PN
DATE:  01/09/2017

 

 

HOSPITALIST PROGRESS NOTE 

 

TIME OF EVALUATION:  12:30 p.m. 

 

SUBJECTIVE DATA:  Denies any pain.  Verbalizes able to swallow his saliva.

 

OBJECTIVE DATA:

VITAL SIGNS:  Temperature 98.2, pulse rate 58, respiratory rate 21, blood 
pressure 112/59, oxygen saturation 99% on room air.

GENERAL:  This is a 81-year-old male lying in bed in no apparent distress.

HEENT:  Head normocephalic and atraumatic.  Eyes:  Anicteric sclerae.  
Conjunctivae clear.  ENT:  Nasal septum is midline.  Oral mucosa is dry.  Scaly 
skin of the face with evidence of radiation burn.

NECK:  Supple.  No JVD noticed.

RESPIRATORY:  Bilaterally clear to auscultation.  No adventitious breath sounds 
heard.  No use of accessory muscles of respiration.

CARDIAC:  Regular rate and rhythm.  No murmurs heard.

ABDOMEN:  Soft, nontender and nondistended.  Bowel sounds positive in all 4 
quadrants.

GENITOURINARY:  Deferred.

EXTREMITIES:  No cyanosis, no clubbing, no edema.  Peripheral pulses are 
palpable.

NEUROLOGIC:  The patient is awake, alert, and oriented.  Cranial nerves are 
grossly intact.

 

LABORATORY AND DIAGNOSTIC DATA:  WBC 4.1, hemoglobin 9.5, hematocrit 28.8, 
platelet count 225.  Sodium 137, potassium 4.5, chloride 100, carbon dioxide 30
, anion gap 13, BUN 10, creatinine 0.47, glucose 102, calcium 8.4.

 



ASSESSMENT AND PLAN:

1.  Dysphagia secondary to possible underlying radiation esophagitis.  Status 
post PEG tube placement.  Continue G-tube feedings.  Pending dietary consult.



2.  Radiation esophagitis.  Radiation being held.  



3.  Stomatitis. Inflammation and ulceration of the oral mucosa.  Most probably 
candidal infection.  Continue local antifungals.



4.  Locally invasive parotid gland tumor.  Status post chemotherapy.  Currently 
undergoing radiation therapy.  Monitor for acute changes.



5.  Pancytopenia with initial severe leukocytopenia.  Likely radiation induced.
  Status post Neupogen.  Stable.  



7.  Normocytic anemia.  Will monitor the H and H closely.



8.  Fluid, electrolytes and nutrition.  Continue G-tube feedings.



9.  Deep venous thrombosis prophylaxis.  Bilateral sequential compression 
devices.



10.  Gastrointestinal prophylaxis.  Proton pump inhibitors.

 

Will call speech therapy for reevaluation.  Obtain a dietary consult.  Obtain 
physical therapy evaluation.  Plan is to discharge the patient home with home 
health.

 

Case discussed with Dr. Mack.

 

 

EDMOND MACK MD, AM/ZURDO

DD:    01/09/2017 14:28:35

DT:    01/09/2017 15:04:08

Conf#: [id number]

DID#:  [id number]

 

MTDD

## 2017-01-09 NOTE — CONS
Date/Time of Note


Date/Time of Note


DATE: 1/9/17 


TIME: 15:01





Assessment/Plan


Assessment/Plan


Chief Complaint/Hosp Course


The patient is a 81 year old male who is currently undergoing radiation therapy 

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  He received 

neoadjuvant carbo/taxol with good response x 3 cycles, starting in September 2016 by Dr. Baker, and has now received 4 weeks of radiation therapy, last 

12/27/16 with Dr. Eugenio Bryson, along with continued platinum/taxane 

chemotherapy, last 2 weeks ago per Dr. Baker. He has been having worsening 

dysphagia over the last couple of months and now it's so severe that he cannot 

even tolerate liquids without coughing. He was advised to go to the ER for 

evaluation by Dr. Bryson's covering partner, Dr. Rica Aguirre, for dehydration

, and was admitted for severe dysphagia and found to have neutropenic fever.


Problems:  


Additional Assessment/Plan


- Neutropenia related to chemotherapy, last approximately 2 weeks ago per Dr. Baker. This has resolved. Neupogen has been discontinued


- Blood and Urine cultures are negative. Although CXR does demonstrate LLL 

pneumonia. Pt is is on Cefepime which he can continue. Would also consider ID 

consult if patient does not improve.  


- Transfuse Hgb < 8, plt < 10 or if bleeding


- s/p G tube for severe dysphagia 1/3/17


- Continue nystatin swish and pain control per primary team


- Case management working on SNF placement per patient request for nursing and 

social support, tube feedings, etc., though may be difficult with daily 

radiation requirements and chemotherapy needs.


- Pt may resume radiation tomorrow after g tube placement though patient 

hesitant to do so - Dr. Bryson will come to discuss with patient


-continue g tube feedings. nutrition consult appreciated.





Consultation Date/Type/Reason


Admit Date/Time


Dec 29, 2016 at 20:57


Initial Consult Date


12/30/16


Type of Consultation:  Hematology/Oncology


Reason for Consultation


neutropenia/ head and neck ca


Referring Provider:  TIM MACK





24 HR Interval Summary


Free Text/Dictation


pt using G tube.





Exam/Review of Systems


Vital Signs


Vitals





 Vital Signs








  Date Time  Temp Pulse Resp B/P Pulse Ox O2 Delivery O2 Flow Rate FiO2


 


1/9/17 07:35 98.2 58 21 112/59 99   


 


1/6/17 20:41      Nasal Cannula 1 


 


1/5/17 09:48        21














 Intake and Output   


 


 1/8/17 1/8/17 1/9/17





 15:00 23:00 07:00


 


Intake Total 300 ml 1825 ml 850 ml


 


Output Total  900 ml 1020 ml


 


Balance 300 ml 925 ml -170 ml











Exam


Constitutional:  alert, oriented


Psych:  no complaints


Head:  atraumatic, normocephalic


Eyes:  nl conjunctiva


ENMT:  nl external ears & nose


Neck:  non-tender, other (severe radiation changes), supple


Respiratory:  clear to auscultation, normal air movement


Cardiovascular:  nl pulses, regular rate and rhythm


Gastrointestinal:  other (g tube in place), soft


Musculoskeletal:  nl extremities to inspection, nl gait and stance


Extremities:  normal pulses


Neurological:  CNS II-XII intact





Results


Result Diagram:  


1/9/17 0610                                                                    

            1/9/17 0610





Results 24 hrs





Laboratory Tests








Test


  1/9/17


06:10


 


Anion Gap 13  


 


Basophils # 0.0  


 


Basophils % 0.0  


 


Blood Morphology Comment   


 


Blood Urea Nitrogen 10  


 


Calcium Level 8.4  


 


Carbon Dioxide Level 31  


 


Chloride Level 100  


 


Creatinine 0.47  L


 


Eosinophils # 0.0  


 


Eosinophils % 0.0  


 


Glucose Level 102  


 


Hematocrit 28.8  L


 


Hemoglobin 9.5  L


 


Lymphocytes # 0.4  L


 


Lymphocytes % 9.2  L


 


Mean Corpuscular Hemoglobin 28.5  L


 


Mean Corpuscular Hemoglobin


Concent 33.1  


 


 


Mean Corpuscular Volume 86.3  


 


Mean Platelet Volume 9.1  


 


Monocytes # 0.3  


 


Monocytes % 8.5  


 


Neutrophils # 3.3  


 


Neutrophils % 82.3  H


 


Nucleated Red Blood Cells # 0.0  


 


Nucleated Red Blood Cells % 0.0  


 


Platelet Count 225  


 


Potassium Level 4.5  


 


Red Blood Count 3.34  L


 


Red Cell Distribution Width 24.5  H


 


Sodium Level 139  


 


White Blood Count 4.1  L











Medications


Medications





 Current Medications


Nystatin (Nystatin Susp) 5 ml QID PO  Last administered on 1/9/17at 13:14; 

Admin Dose 5 ML;  Start 12/29/16 at 23:00


Chlorhexidine Gluconate (Peridex) 15 ml Q12 MT  Last administered on 1/9/17at 09

:04; Admin Dose 15 ML;  Start 12/30/16 at 09:00


Ondansetron HCl (Zofran Inj) 4 mg Q6H  PRN IV NAUSEA AND/OR VOMITING;  Start 12/ 29/16 at 23:30


Pantoprazole 40 mg 40 mg BID@06,18 IV  Last administered on 1/9/17at 05:43; 

Admin Dose 40 MG;  Start 12/30/16 at 18:00


Cefepime HCl (Maxipime 2gm/50 ml (Pmx)) 50 ml @  100 mls/hr Q8 IVPB  Last 

administered on 1/9/17at 13:14; Admin Dose 100 MLS/HR;  Start 12/30/16 at 14:00


Scopolamine (Transderm-Scop) 1 patch Q72H TRANSDERM  Last administered on 1/6/ 17at 12:27; Admin Dose 1 PATCH;  Start 12/31/16 at 11:30


Bisacodyl 10 mg 10 mg DAILY  PRN [place] CONSTIPATION Last administered on 1/2/17at 

12:58; Admin Dose 10 MG;  Start 1/2/17 at 12:30


Potassium Chloride/Dextrose/ Sodium Chloride (KCl/D5-NS) 1,000 ml @  75 mls/hr 

[id number] IV  Last administered on 1/9/17at 13:24; Admin Dose 75 MLS/HR;  Start 1/

3/17 at 19:30











CODI STRICKLAND M.D. Jan 9, 2017 15:04

## 2017-01-10 NOTE — PDOCDIS
Discharge Instructions


DIAGNOSIS


Discharge Diagnosis:  Dysphagia.





CONDITION


Patient Condition:  Stable





HOME CARE INSTRUCTIONS:


Special Diet:  NPO, GTUBE





FOLLOW UP/APPOINTMENTS


Appointments


Please follow-up with Dr. Heck.





OTHER ORDERS:


Other Orders:


1.  Take tube feedings.  Oral intake for a gratification.


2.  Resume home medications.


3.  Follow-up with the radiation oncologist.


4.  Activities as tolerated.











EDMOND COOPER NP Brad 10, 2017 11:24

## 2017-01-10 NOTE — DS
DATE OF ADMISSION: 12/29/2016



DATE OF DISCHARGE: 01/10/2017

 

FINAL DIAGNOSES

1.  Dysphagia secondary to possible underlying radiation esophagitis.  Status 
post PEG tube placement. 

2.  Possible radiation esophagitis.  Improved status post cessation of 
radiation.

3.  Oral candidiasis.  Status post treatment.

4.  Locally invasive parotid gland tumor.  Status post chemotherapy.  Currently 
undergoing radiation therapy.

5.  Pancytopenia with severe leukocytopenia.  Resolved.  Status post Neupogen.

6.  Normocytic anemia.

7.  Dyslipidemia.

8.  Benign prostatic hypertrophy.

 

CONSULTATIONS:

1.  Dr. Gunnar Ro, gastroenterology.

2.  Dr. Miryam Monterroso, hematology/oncology.

3.  Dr. Alanna Chacko, hematology/oncology.

4.  Dr. Jesus Molina, gastroenterology.

 

HOSPITAL COURSE:  This is a 81-year-old male who is currently undergoing 
radiation therapy for parotid gland tumor who came to the emergency room with 
chief complaint of difficulty with swallowing.  The patient had worsening 
dysphagia to the point that he was unable to even tolerate any liquids without 
coughing.  The patient was advised to go to the ER for evaluation by his 
radiation oncologist's office.  Provided the patient's history of present 
illness and his comorbidities, a clinical decision was made to admit the 
patient to inpatient setting to have him further evaluated. The patient was 
admitted to inpatient medical/surgical floor.  A gastroenterology consult and 
oncology consult was called on this patient.  The patient was kept n.p.o.  The 
patient was started on IV fluids.

 

The patient was seen and evaluated by speech therapy.  The patient was 
concluded to be not a good candidate for oral intake because of significant 
dysphagia secondary to radiation esophagitis as well fungal stomatitis.  Hence 
the patient had a G-tube placed.  The patient had a PEG tube placed on 01/03/
2017 for feeding.  The patient was started on G-tube feedings and the patient 
was able to tolerate G-tube feedings without any significant gastrointestinal 
disturbances.  Regarding the patient's radiation esophagitis and fungal 
stomatitis, the patient was maintained on local antifungals and Peridex 
mouthwash.  The patient's symptoms improved significantly.  The patient was 
also noticed to have pancytopenia with severe leukocytopenia upon admission.  
The patient's leukocytopenia could be most probably radiation induced.  The 
patient received Neupogen as per oncology with improvement in the patient's 
leukocytes.  The patient was also noticed to have normocytic anemia.  The 
patient's H and H remained stable.  The patient did not require any blood 
transfusions throughout the patient's hospital course.  

 

The patient has locally invasive parotid gland tumor.  The patient is status 
post chemotherapy for the same.  The patient is currently undergoing radiation 
therapy with Dr. Bryson.  The patient's radiation therapy was held during this 
hospitalization because of the patient's radiation esophagitis and significant 
dysphagia.  The patient's chest x-ray showed mild left lower lobe patchy 
consolidation.  He was maintained on antibiotics for any underlying pneumonia. 
The patient was cleared by consultants to be discharged home.  The patient is 
able to tolerate tube feedings without significant gastrointestinal symptoms.  
The patient was seen and evaluated by speech therapist after the patient's 
radiation esophagitis has improved.  However, as per the speech pathologist, 
the patient is most likely prone to develop radiation esophagitis again, since 
the patient is scheduled to receive more radiation therapy.  Hence, the patient 
is not a stable candidate for oral intake for a complete nutritional intake.  
However, the speech  therapist concluded that the patient can have oral intake 
for oral gratification.  The patient understands this and the risk of 
aspiration if the patient continues to take oral food despite knowing 
underlying condition.  

 

Home health was arranged for helping the patient with tube feedings.  The 
patient was also seen and evaluated by physical therapist since the patient was 
deconditioned with prolonged bed stay.  However, physical therapy recommended 
no skilled physical therapy needs.  The patient is stable to be discharged home 
today.

 

DISCHARGE DISPOSITION/PLAN:  The patient will be discharged home today.  The 
patient was instructed to take tube feedings.  He was instructed to take oral 
fluids for gratification purposes only.  He was instructed to resume his home 
medications.  He was instructed to follow up with his radiation oncologist. He 
was instructed to resume activities as tolerated.  The patient verbalized 
understanding of his discharge instructions.

 

CONDITION AT DISCHARGE:  Stable.

 

DISCHARGE MEDICATIONS:

1.  Atorvastatin 20 mg p.o. daily.

2.  Tamsulosin 0.4 mg p.o. daily.  

 

PERTINENT LABORATORY AND DIAGNOSTIC DATA:

1.  Esophagogastroduodenoscopy with PEG tube placement on 01/03/2017.  Mild 
distal esophagitis was evident on his esophagogastroduodenoscopy.

2.  Soft tissue neck CT.  No evidence of airway or esophageal obstruction.  
Left parotid and sternocleidomastoid mass measuring approximately 3.3 cm x 2.9 
cm x 5.7 cm.  Right internal jugular foramen partially calcified mass measuring 
8 mm x 1.7 cm.  Radiation changes in the oropharyngeal mucosa.

4.  Chest x-ray:  Mild left lower lobe patchy consolidation.  The patient was 
maintained on empiric antibiotics for any underlying infection because of the 
patient's significant pancytopenia.  The patient's pan cultures remain negative.

5.  Latest CBC:  WBC 3.8, hemoglobin 9.3, hematocrit 27.4, platelet count 235.

6.  Latest BMP:  Sodium 130, potassium 4.8, chloride 90, carbon dioxide 30, 
anion gap 13, BUN 11, creatinine 0.47, glucose 148, calcium 8.0, phosphorus 3.5
, magnesium 1.7. 

7.  Hemoglobin A1c 6.2.

 

At this time, we would like to thank all the consultants for seeing the patient
, doing the necessary procedures, and providing clinical recommendations.

 

The case and management of this patient was fully discussed with Dr. Mack.

 

Approximately 40 minutes was spent on coordinating the discharge on this 
patient.

 

 

EDMOND MACK MD, AM/ZURDO

DD:    01/10/2017 15:11:41

DT:    01/10/2017 17:21:46

Conf#: [id number]

DID#:  [id number]

 

MTDD

## 2017-02-24 ENCOUNTER — HOSPITAL ENCOUNTER (EMERGENCY)
Dept: HOSPITAL 10 - E/R | Age: 82
Discharge: LEFT BEFORE DISCHARGE | End: 2017-02-24
Payer: COMMERCIAL

## 2017-02-24 VITALS
WEIGHT: 114.64 LBS | HEIGHT: 68 IN | BODY MASS INDEX: 17.37 KG/M2 | HEIGHT: 68 IN | WEIGHT: 114.64 LBS | BODY MASS INDEX: 17.37 KG/M2

## 2017-02-24 DIAGNOSIS — Z53.21: Primary | ICD-10-CM

## 2017-03-04 ENCOUNTER — HOSPITAL ENCOUNTER (EMERGENCY)
Dept: HOSPITAL 10 - E/R | Age: 82
Discharge: LEFT BEFORE DISCHARGE | End: 2017-03-04
Payer: SELF-PAY

## 2017-03-04 VITALS
BODY MASS INDEX: 17.54 KG/M2 | HEIGHT: 68 IN | HEIGHT: 68 IN | WEIGHT: 115.74 LBS | BODY MASS INDEX: 17.54 KG/M2 | WEIGHT: 115.74 LBS

## 2017-03-04 DIAGNOSIS — Z53.21: Primary | ICD-10-CM

## 2017-08-27 ENCOUNTER — HOSPITAL ENCOUNTER (EMERGENCY)
Dept: HOSPITAL 10 - E/R | Age: 82
Discharge: HOME | End: 2017-08-27
Payer: COMMERCIAL

## 2017-08-27 VITALS
WEIGHT: 154.32 LBS | BODY MASS INDEX: 29.14 KG/M2 | WEIGHT: 154.32 LBS | HEIGHT: 61 IN | HEIGHT: 61 IN | BODY MASS INDEX: 29.14 KG/M2

## 2017-08-27 DIAGNOSIS — Z43.1: Primary | ICD-10-CM

## 2017-08-27 PROCEDURE — 99283 EMERGENCY DEPT VISIT LOW MDM: CPT

## 2017-08-27 NOTE — ERD
ER Documentation


Chief Complaint


Date/Time


DATE: 8/27/17 


TIME: 09:36


Chief Complaint


has gt for long time , wants to have it removed





HPI


This is a very pleasant 82-year-old male who had a remote history of a T3N2b 

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  The patient had been 

diagnosed in January 2017 and admitted to the hospital.  The patient underwent 

chemotherapy and radiation.  The patient indicates that he has been in 

remission since February 2017.  During the patient's initial diagnosis in 

January 2017 the patient had a PEG tube placed and received tube feedings as it 

was difficult for the patient to eat due to radiation esophagitis and fungal 

stomatitis.  The patient however states that he has not used the feeding tube 

since January 9, 2017, 7 months prior to arrival.  The patient presents to the 

emergency department today requesting removal of the feeding tube.  He states 

he was unable to see the GI physician Dr. Bryson who placed the feeding tube 

as a consult while in the hospital at [facility] in January 2017.  The 

patient states he has had no fevers or shaking or chills.  He denies any 

abdominal pain.  He states his only reason for coming to the hospital today is 

for removal of the PEG tube.  He indicates that for the past 7 months he has 

been eating food by mouth which includes soups oatmeal and all liquids.  He has 

been gaining weight.  He states the parotid mass has completely resolved and 

again is in remission.





ROS


All systems reviewed and are negative except as per history of present illness.





Medications


Home Meds


Active Scripts


Cephalexin* (Keflex*) 500 Mg Capsule, 500 MG PO Q8, #21 CAP


   Prov:BLANCAFELA         8/27/17


Reported Medications


Atorvastatin Calcium* (Atorvastatin Calcium*) 20 Mg Tablet, 20 MG PO DAILY, #30 

TAB


   12/29/16


Tamsulosin Hcl* (Tamsulosin Hcl*) 0.4 Mg Cap.er.24h, 0.4 MG PO DAILY, CAP


   12/29/16





Allergies


Allergies:  


Coded Allergies:  


     No Known Allergy (Unverified , 12/29/16)





PMhx/Soc


Hx Miscellaneous Medical Probl:  Yes (severe dyspahgia,malignant tumro,BPH,

radiation therapy)


Hx Alcohol Use:  No


Hx Substance Use:  No


Hx Tobacco Use:  No





Physical Exam


Vitals





Vital Signs








  Date Time  Temp Pulse Resp B/P Pulse Ox O2 Delivery O2 Flow Rate FiO2


 


8/27/17 09:23 98.1 86 20 118/65 99   








Physical Exam


Constitutional:Well-developed. Well-nourished.


HEENT:Normocephalic. Atraumatic.Pupils were equal round reactive to light. 

Moist mucous membranes.No tonsillar exudates.


Neck: No nuchal rigidity. No lymphadenopathy. No posterior cervical spine 

tenderness or step-offs.  No parotid gland masses palpable.


Respiratory: Not using accessory muscles of respiration.Lungs were clear to 

auscultation bilaterally. No rhonchi. No rales. No wheezing. 


Cardiovascular: Regular rate regular rhythm.No murmurs. No rubs were 

appreciated.S1, S2 normal. Distal pulses are palpable 2+ bilaterally.


GI: Abdomen was soft. Nontender. Non Distended. No pulsatile abdominal masses 

or bruits. No rebound. No guarding. Bowel sounds were present and normal.  G-

tube was present in the left lower quadrant with surrounding purulent drainage 

with no erythremia warmth tenderness fluctuance or induration around the ostomy 

site


NEURO: Patient was alert, awake, orientated x3.No facial droop. Gait observed 

and normal with no ataxia.Speech had regular rate and rhythm. No focal 

neurological deficits.





Procedures/MDM


This patient presented to the emergency department requesting removal of his G-

tube.  I explained to the patient that this would best be performed by the GI 

physician however the patient stated he wanted this done today and was very 

adamant about this.  Therefore at this time the PEG tube was removed by myself.

  This was done under sterile condition.  Betadine was used to clean the ostomy 

site. And 3 simple interrupted sutures using chromic gut were used to close the 

ostomy site.  Lidocaine 1% without epinephrine, 2 cc had been used to 

anesthetize the wound.  Patient tolerated the procedure well.  Kerlix dressing 

was placed over the wound I did indicate to the patient that I will prescribe a 

short course of antibiotics to prevent secondary infection.





The patient was discharged home in fair condition. They were instructed to 

return to the emergency department at any time if there was any worsening of 

their condition. The patient stated they would follow up with their PCP in the 

next 24-48 hours to initiate a suitable medication regimen under the care of 

their PCP as well as to allow their PCP to monitor any drug reactions. The 

patient was discharged home with prescriptions after they gave informed consent 

to the new medication.  They were also fully informed by myself on the adverse 

effects and adverse drug interactions in order to provide adequate safeguards 

to prevent possible adverse reactions to medications.





Departure


Diagnosis:  


 Primary Impression:  


 Complication of catheter


 Encounter type:  initial encounter  Qualified Code:  T85.9XXA - Complication 

of catheter, initial encounter


Condition:  FELA Calero Aug 27, 2017 09:43

## 2019-07-07 ENCOUNTER — HOSPITAL ENCOUNTER (INPATIENT)
Dept: HOSPITAL 91 - ICU | Age: 84
LOS: 1 days | Discharge: HOME HEALTH | DRG: 66 | End: 2019-07-08
Payer: COMMERCIAL

## 2019-07-07 ENCOUNTER — HOSPITAL ENCOUNTER (INPATIENT)
Dept: HOSPITAL 10 - E/R | Age: 84
LOS: 1 days | Discharge: HOME HEALTH | DRG: 66 | End: 2019-07-08
Attending: INTERNAL MEDICINE | Admitting: INTERNAL MEDICINE
Payer: COMMERCIAL

## 2019-07-07 VITALS
BODY MASS INDEX: 19.39 KG/M2 | WEIGHT: 116.4 LBS | WEIGHT: 116.4 LBS | HEIGHT: 65 IN | BODY MASS INDEX: 19.39 KG/M2 | HEIGHT: 65 IN

## 2019-07-07 DIAGNOSIS — Z85.89: ICD-10-CM

## 2019-07-07 DIAGNOSIS — R13.10: ICD-10-CM

## 2019-07-07 DIAGNOSIS — N40.0: ICD-10-CM

## 2019-07-07 DIAGNOSIS — Z91.81: ICD-10-CM

## 2019-07-07 DIAGNOSIS — I62.02: Primary | ICD-10-CM

## 2019-07-07 DIAGNOSIS — I95.89: ICD-10-CM

## 2019-07-07 DIAGNOSIS — S09.90XA: ICD-10-CM

## 2019-07-07 DIAGNOSIS — S01.111A: ICD-10-CM

## 2019-07-07 LAB
ADD MAN DIFF?: NO
ANION GAP: 13 (ref 5–13)
BASOPHIL #: 0 10^3/UL (ref 0–0.1)
BASOPHILS %: 0.5 % (ref 0–2)
BLOOD UREA NITROGEN: 9 MG/DL (ref 7–20)
CALCIUM: 8.8 MG/DL (ref 8.4–10.2)
CARBON DIOXIDE: 22 MMOL/L (ref 21–31)
CHLORIDE: 104 MMOL/L (ref 97–110)
CREATININE: 0.62 MG/DL (ref 0.61–1.24)
EOSINOPHILS #: 0 10^3/UL (ref 0–0.5)
EOSINOPHILS %: 0.2 % (ref 0–7)
GLUCOSE: 131 MG/DL (ref 70–220)
HEMATOCRIT: 39.6 % (ref 42–52)
HEMOGLOBIN A1C: 5.4 % (ref 0–5.9)
HEMOGLOBIN: 13.2 G/DL (ref 14–18)
IMMATURE GRANS #M: 0.02 10^3/UL (ref 0–0.03)
IMMATURE GRANS % (M): 0.3 % (ref 0–0.43)
INR: 0.91
LYMPHOCYTES #: 0.6 10^3/UL (ref 0.8–2.9)
LYMPHOCYTES %: 9.6 % (ref 15–51)
MEAN CORPUSCULAR HEMOGLOBIN: 30.9 PG (ref 29–33)
MEAN CORPUSCULAR HGB CONC: 33.3 G/DL (ref 32–37)
MEAN CORPUSCULAR VOLUME: 92.7 FL (ref 82–101)
MEAN PLATELET VOLUME: 9.1 FL (ref 7.4–10.4)
MONOCYTE #: 0.3 10^3/UL (ref 0.3–0.9)
MONOCYTES %: 4.2 % (ref 0–11)
NEUTROPHIL #: 5.3 10^3/UL (ref 1.6–7.5)
NEUTROPHILS %: 85.2 % (ref 39–77)
NUCLEATED RED BLOOD CELLS #: 0 10^3/UL (ref 0–0)
NUCLEATED RED BLOOD CELLS%: 0 /100WBC (ref 0–0)
PARTIAL THROMBOPLASTIN TIME: 22.9 SEC (ref 23–35)
PLATELET COUNT: 198 10^3/UL (ref 140–415)
POTASSIUM: 4 MMOL/L (ref 3.5–5.1)
PROTIME: 12.4 SEC (ref 11.9–14.9)
PT RATIO: 1
RED BLOOD COUNT: 4.27 10^6/UL (ref 4.7–6.1)
RED CELL DISTRIBUTION WIDTH: 14.2 % (ref 11.5–14.5)
SODIUM: 139 MMOL/L (ref 135–144)
TROPONIN-I: < 0.012 NG/ML (ref 0–0.12)
WHITE BLOOD COUNT: 6.3 10^3/UL (ref 4.8–10.8)

## 2019-07-07 PROCEDURE — 84484 ASSAY OF TROPONIN QUANT: CPT

## 2019-07-07 PROCEDURE — 83036 HEMOGLOBIN GLYCOSYLATED A1C: CPT

## 2019-07-07 PROCEDURE — 97161 PT EVAL LOW COMPLEX 20 MIN: CPT

## 2019-07-07 PROCEDURE — 71045 X-RAY EXAM CHEST 1 VIEW: CPT

## 2019-07-07 PROCEDURE — 93005 ELECTROCARDIOGRAM TRACING: CPT

## 2019-07-07 PROCEDURE — 70450 CT HEAD/BRAIN W/O DYE: CPT

## 2019-07-07 PROCEDURE — 87081 CULTURE SCREEN ONLY: CPT

## 2019-07-07 PROCEDURE — 85610 PROTHROMBIN TIME: CPT

## 2019-07-07 PROCEDURE — 85025 COMPLETE CBC W/AUTO DIFF WBC: CPT

## 2019-07-07 PROCEDURE — 99285 EMERGENCY DEPT VISIT HI MDM: CPT

## 2019-07-07 PROCEDURE — 72125 CT NECK SPINE W/O DYE: CPT

## 2019-07-07 PROCEDURE — 85730 THROMBOPLASTIN TIME PARTIAL: CPT

## 2019-07-07 PROCEDURE — 80048 BASIC METABOLIC PNL TOTAL CA: CPT

## 2019-07-07 PROCEDURE — 36415 COLL VENOUS BLD VENIPUNCTURE: CPT

## 2019-07-07 RX ADMIN — THIAMINE HYDROCHLORIDE 1 MLS/HR: 100 INJECTION, SOLUTION INTRAMUSCULAR; INTRAVENOUS at 22:29

## 2019-07-07 RX ADMIN — THIAMINE HYDROCHLORIDE 1 MLS/HR: 100 INJECTION, SOLUTION INTRAMUSCULAR; INTRAVENOUS at 22:21

## 2019-07-07 RX ADMIN — LIDOCAINE HYDROCHLORIDE 1 MLS/HR: 10 INJECTION, SOLUTION EPIDURAL; INFILTRATION; INTRACAUDAL; PERINEURAL at 18:47

## 2019-07-07 NOTE — HP
Date/Time of Note


Date/Time of Note


DATE: 7/7/19 


TIME: 23:40





Assessment/Plan


VTE Prophylaxis


SCD applied (from Nsg):  Yes


Pharmacological prophylaxis:  NA/contraindicated


Pharm contraindication:  bleeding





Lines/Catheters


IV Catheter Type (from Nrsg):  Saline Lock





Assessment/Plan


Assessment/Plan





1.  Subacute SDH


-Patient was brought from skilled nursing after a syncopal episode with a CT showing 


subacute SDH.  This is unlikely related to his syncope.  Patient was given 2 BP 


meds for elevated blood pressure and then he was hypotensive with SBP in the 


60s.


-Admit to ICU for close observation


-Neurosurgery eval


-Repeat brain imaging





2.  Syncope: Most likely secondary to hypotensive episode


-See #1





3.  History of tonsillar/parotid cancer status post chemoradiation


-Obtain MRI of the brain given CT findings





4.  Chronic dysphagia, secondary to #3


-Speech/swallow eval





5.  BPH: Continue Flomax


Result Diagram:  


7/7/19 1813 7/7/19 1813





Results 24hrs





Laboratory Tests


        Test
                                7/7/19
18:10  7/7/19
18:13


        Hemoglobin A1c                              5.4


        White Blood Count                                        6.3  #


        Red Blood Count                                        4.27  #L


        Hemoglobin                                             13.2  #L


        Hematocrit                                             39.6  #L


        Mean Corpuscular Volume                                  92.7


        Mean Corpuscular Hemoglobin                              30.9


        Mean Corpuscular Hemoglobin
Concent  
                  33.3  



        Red Cell Distribution Width                             14.2  #


        Platelet Count                                            198


        Mean Platelet Volume                                      9.1


        Immature Granulocytes %                                 0.300


        Neutrophils %                                           85.2  H


        Lymphocytes %                                            9.6  L


        Monocytes %                                               4.2


        Eosinophils %                                             0.2


        Basophils %                                               0.5


        Nucleated Red Blood Cells %                               0.0


        Immature Granulocytes #                                 0.020


        Neutrophils #                                             5.3


        Lymphocytes #                                            0.6  L


        Monocytes #                                               0.3


        Eosinophils #                                             0.0


        Basophils #                                               0.0


        Nucleated Red Blood Cells #                               0.0


        Prothrombin Time                                         12.4


        Prothrombin Time Ratio                                    1.0


        INR International Normalized
Ratio   
                  0.91  



        Activated Partial
Thromboplast Time  
                 22.9  L



        Sodium Level                                              139


        Potassium Level                                           4.0


        Chloride Level                                            104


        Carbon Dioxide Level                                       22


        Anion Gap                                                  13


        Blood Urea Nitrogen                                         9


        Creatinine                                               0.62


        Est Glomerular Filtrat Rate
mL/min   
               



        Glucose Level                                             131


        Calcium Level                                             8.8


        Troponin I                                         < 0.012








HPI/ROS


Admit Date/Time


Admit Date/Time








Hx of Present Illness





Patient is an 84-year-old male with a history of BPH and tonsillar/parotid 


carcinoma status post chemoradiation who was brought from skilled nursing for syncope and 


is status post fall.  He was arrested for driving without a license.  While at 


the skilled nursing, he was given BP meds for hypertension.  Shortly after patient had a 


syncopal episode and fell sustaining a laceration to his right eyebrow.  At that


time he was found to be hypotensive with systolic blood pressure in the 60s.  


Patient himself initially said he fell out of bed, but later on he said he did 


not remember what happened.  He does not have any focal weakness, no head injury


and he is not complaining of any headache or visual disturbance.  Patient does 


have chronic dysphagia because of his history of cancer.  When he presented to 


ER, vitals were stable.  Brain CT and cervical CT results as shown below.





Brain CT


1. Thin 7 mm left frontoparietal convexity subacute subdural hematoma with 


minimal local mass effect.


2. Very thin 1-2 mm right parietal convexity subacute subdural hematoma versus 


dural thickening, without associated mass effect.


3. Right CP angle cistern 1.7 cm probable meningioma mildly impressing on the 


anterior inferior right cerebellar hemisphere.


4. No acute intracranial hemorrhage, midline shift nor herniation. No depressed 


calvarial fracture.


5. Mild presumed chronic small vessel ischemic changes. MRI brain has improved 


sensitivity for acute infarct or subtle lesion. 


6. Mild ventriculomegaly slightly out of proportion to the sulci, which may 


reflect central atrophy or chronic compensated communicating hydrocephalus.


7. Complete opacification of the left mastoid air cells and middle ear cavity 


may reflect acute otomastoiditis. Clinical correlation with otoscopic evaluation


recommended.


8. Left maxillary - ethmoidal paranasal sinus fluid opacification, possibly 


reflecting acute sinusitis.








Cervical CT


 1. Demineralization. 


 2. Posterior disc space narrowing with chronic bone on bone articulation, at 


least mild anterior spinal stenosis, and bilateral intervertebral foraminal 


stenosis seen at the C5-6 level.


 3. Moderate to severe degenerative changes seen throughout the bilateral 


cervical spine posterior facets.


 4. No evident acute fracture.


 5. The previously identified partially calcified intracranial lesion arising 


from the right jugular foramen on CT examination of the cervical spine dated 


12/29/2016 appears increased in size over interval.


 6. Recommend follow-up contrast enhanced MRI examination of the brain for 


further evaluation. MR angiography of the head and neck may be of further use. 


 7. In setting of tonsillar neoplasm differential considerations include a 


metastatic deposit.





PMH/Family/Social


Past Medical History


Medical History:  other (See HPI)


Medications





Current Medications


Acetaminophen (Tylenol Tab) 650 mg ER BRIDGE PRN PO .MILD PAIN 1-3 OR TEMP;  


Start 7/7/19 at 21:00;  Stop 7/8/19 at 20:59


Sodium Chloride 1,000 ml @  75 mls/hr [id number] IV ;  Start 7/7/19 at 22:21


Ondansetron HCl (Zofran Inj) 4 mg Q6H  PRN IV NAUSEA AND/OR VOMITING;  Start 


7/7/19 at 22:30


Albuterol/ Ipratropium (Duoneb) 3 ml Q2H RESP THERAPY  PRN NEB SHORTNESS OF 


BREATH;  Start 7/7/19 at 22:30


Phenylephrine HCl 250 ml @  75 mls/hr PER PROTOCOL IV ;  Start 7/7/19 at 22:30


Coded Allergies:  


     Penicillins (Verified  Allergy, Unknown, 7/8/19)





Past Surgical History


Past Surgical Hx:  other (See HPI)





Family History


Significant Family History:  other





Social History


Alcohol Use:  other


Smoking Status:  Never smoker


Drug Use:  other





Exam/Review of Systems


Vital Signs


Vitals





Vital Signs


  Date      Temp  Pulse  Resp  B/P (MAP)   Pulse Ox  O2          O2 Flow    FiO2


Time                                                 Delivery    Rate


    7/7/19          136    19      107/67        96  Room Air


     22:33                           (80)


    7/7/19  97.9


     18:00








Exam


Constitutional:  other (No acute distress.  Answering questions appropriately 


and able speak in full sentences)


Head:  normocephalic, atraumatic


Eyes:  other (Small laceration over the right eye)


Respiratory:  clear to auscultation, normal air movement


Cardiovascular:  regular rate and rhythm, nl pulses


Gastrointestinal:  soft, non-tender


Extremities:  normal pulses


Neurological:  nl mental status, nl speech, nl strength











JARVIS PHELPS MD                  Jul 7, 2019 23:40

## 2019-07-07 NOTE — ERD
ER Documentation


Chief Complaint


Chief Complaint





BIB [facility] FOR EVAL OF SYNCOPAL EPISODE W/ LAC TO FOREHEAD.





HPI


84-year-old male brought in by ambulance from alf after syncopal episode with 


head injury.  Reportedly the patient was arrested for driving without a license.


 In alf he was noted to be hypertensive so he was treated with 2 oral 


antihypertensives which I do not know the names of.  After that the patient had 


a syncopal episode and was noted to be hypotensive with a systolic blood 


pressure in the 60s.  Upon arrival, his blood pressure has normalized.  Patient 


is denying any headache, dizziness, nausea, vomiting, chest pain, abdominal 


pain, recent fevers or chills.





ROS


All systems reviewed and are negative except as per history of present illness.





Medications


Home Meds


Reported Medications


Tamsulosin Hcl* (Flomax*) 0.4 Mg Cap.er.24h, 0.4 MG PO HS, CAP


   7/7/19


Atorvastatin Calcium* (Atorvastatin Calcium*) 20 Mg Tablet, 20 MG PO QHS, #30 T


AB


   7/7/19


Discontinued Reported Medications


Atorvastatin Calcium* (Atorvastatin Calcium*) 20 Mg Tablet, 20 MG PO DAILY, #30 


TAB


   12/29/16


Tamsulosin Hcl* (Tamsulosin Hcl*) 0.4 Mg Cap.er.24h, 0.4 MG PO DAILY, CAP


   12/29/16


Discontinued Scripts


Cephalexin* (Keflex*) 500 Mg Capsule, 500 MG PO Q8, #21 CAP


   Prov:FELA RIOS MD         8/27/17





Allergies


Allergies:  


Coded Allergies:  


     No Known Allergy (Unverified , 7/7/19)





PMhx/Soc


History of Surgery:  No


Hx Miscellaneous Medical Probl:  Yes (severe dysphagia, parotid cancer status 


post chemo and radiation,BPH,radiation therapy)


Hx Alcohol Use:  No


Hx Substance Use:  No


Hx Tobacco Use:  No





FmHx


Family History:  No diabetes





Physical Exam


Vitals





Vital Signs


  Date      Temp  Pulse  Resp  B/P (MAP)   Pulse Ox  O2          O2 Flow    FiO2


Time                                                 Delivery    Rate


    7/7/19  97.9     69    18      129/80        99


     18:00                           (96)





Physical Exam


INITIAL VITAL SIGNS: Reviewed by me 


GENERAL: Well developed, well nourished. 


HEAD: 2.5 cm superficial laceration of the right lateral brow.  No hematomas 


noted.  No skull depression.


EYES: EOMI. PERRL. No subconjunctival hemorrhage 


ENT:  dry mucous membranes.  Nose non-tender. No septal hematoma. Nasopharynx 


and oropharynx clear. No dental, lip, or tongue injury 


NECK: No cervical spine TTP 


RESPIRATORY: Clear to auscultation bilaterally. No increased work of breathing. 


CV: Regular rate and rhythm. Cap refill <2sec. 2+ Radial and 2+ dorsalis pedis 


pulses. 


ABDOMEN: Soft, non-distended, non-tender. No guarding or rebound. Normal active 


bowel sounds. 


BACK: No thoracic or lumbar spine TTP. No CVA tenderness. 


EXTREMITIES: Normal to inspection and palpation. No deformities seen. Full ROM 


in extremities. 


SKIN: Warm, dry, pink. 


NEUROLOGIC: A&Ox4. No facial asymmetry. Motor and sensory function intact to all


4 extremities.


Result Diagram:  


7/7/19 1813 7/7/19 1813





Results 24 hrs





Laboratory Tests


              Test
                                  7/7/19
18:13


              White Blood Count                      6.3 10^3/ul


              Red Blood Count                       4.27 10^6/ul


              Hemoglobin                               13.2 g/dl


              Hematocrit                                  39.6 %


              Mean Corpuscular Volume                    92.7 fl


              Mean Corpuscular Hemoglobin                30.9 pg


              Mean Corpuscular Hemoglobin
Concent     33.3 g/dl 



              Red Cell Distribution Width                 14.2 %


              Platelet Count                         198 10^3/UL


              Mean Platelet Volume                        9.1 fl


              Immature Granulocytes %                    0.300 %


              Neutrophils %                               85.2 %


              Lymphocytes %                                9.6 %


              Monocytes %                                  4.2 %


              Eosinophils %                                0.2 %


              Basophils %                                  0.5 %


              Nucleated Red Blood Cells %            0.0 /100WBC


              Immature Granulocytes #              0.020 10^3/ul


              Neutrophils #                          5.3 10^3/ul


              Lymphocytes #                          0.6 10^3/ul


              Monocytes #                            0.3 10^3/ul


              Eosinophils #                          0.0 10^3/ul


              Basophils #                            0.0 10^3/ul


              Nucleated Red Blood Cells #            0.0 10^3/ul


              Prothrombin Time                          12.4 Sec


              Prothrombin Time Ratio                         1.0


              INR International Normalized
Ratio           0.91 



              Activated Partial
Thromboplast Time      22.9 Sec 



              Sodium Level                            139 mmol/L


              Potassium Level                         4.0 mmol/L


              Chloride Level                          104 mmol/L


              Carbon Dioxide Level                     22 mmol/L


              Anion Gap                                       13


              Blood Urea Nitrogen                        9 mg/dl


              Creatinine                              0.62 mg/dl


              Est Glomerular Filtrat Rate
mL/min    mL/min 



              Glucose Level                            131 mg/dl


              Calcium Level                            8.8 mg/dl


              Troponin I                           < 0.012 ng/ml





Current Medications


 Medications
   Dose
          Sig/Rosey
       Start Time
   Status  Last


 (Trade)       Ordered        Route
 PRN     Stop Time              Admin
Dose


                              Reason                                Admin


 Sodium         500 ml @ 
     Q1H STAT
      7/7/19        DC            7/7/19


Chloride       500 mls/hr     IV
            17:58
 7/7/19                18:47



                                             18:57


 Ondansetron    4 mg           ER BRIDGE      7/7/19                 



HCl
  (Zofran                 PRN
 IV
       21:00
 7/8/19


Inj)                          NAUSEA/VOMITI  20:59


                              NG


                650 mg         ER BRIDGE      7/7/19                 



Acetaminophen                 PRN
 PO
       21:00
 7/8/19



  (Tylenol                   .MILD PAIN     20:59


Tab)                          1-3 OR TEMP








Procedures/MDM


EMERGENT LABS AND DIAGNOSTIC STUDIES: 


Lab Results above were reviewed and interpreted by me. 





CBC: no anemia or evidence of infection


BMP: No e/o clinically significant electrolyte abnormality severe acidosis, 


alkalosis, renal failure, diabetic ketoacidosis


Troponin within normal limits, not indicative of cardiac ischemia


Coags within normal limits





12-lead EKG was interpreted by ADARSH Moss MD: 


 Sinus Rhythm with first-degree AV block with ventricular rate of 85 beats per 


minute 


Normal axis 


Anterior Q waves


Normal intervals 


No acute ST or T wave changes suggestive of acute ischemia or STEMI. 


___________________________________________________________ 


Radiology Results as interpreted by Radiology below were reviewed by SSteven Moss MD: 





CT head shows subacute small subdural hematoma in the left frontoparietal area. 


No other acute abnormalities noted





CT C-spine with no acute traumatic abnormalities





Chest x-ray shows no acute abnormalities





___________________________________________________________ 


Initial Nursing notes reviewed. 


Previous Medical Records requested via the Electronic Health Record. 





EMERGENCY DEPARTMENT COURSE / MEDICAL DECISION MAKING: 





Procedures laceration Repair by me:


Anesthesia:         None


Location:         Right lateral brow


Tendon/Joint/Nerves:      No injury


Foreign body:         None detected after copious irrigation and exploration


Technique:         Steri-Strips


Complexity:         No subcutaneous sutures/mucosal repair/edge excision


Post Closure Length:      2.5 cm





Patient's bleeding was easily controlled in the department and there is no 


indication of anemia.





48 hour wound check.  Scar minimization instructions given.


:


MDM


Patient is presenting after syncopal episode, likely secondary to 


antihypertensives that were administered to him.  Currently his vitals are 


normal.  He is mentating normally.  He is neurovascularly intact on exam.  CT 


head done given his age which showed old subdural hematomas, but no new 


bleeding.  I discussed this with the neurosurgeon on-call, Dr. Hawkins, who will 


consult on the patient but thinks that this bleed on the CT is old.  Patient nick


l be admitted for observation for syncope and for close neurologic monitoring.





Accepting Care Team:


Current data and ongoing care discussed.


Time:                      Time of admission


Primary Provider:      Dr. Alcazar


Consulting:                  Dr. Hawkins (Neurosurgery)


Outstanding Data:       none





Departure


Diagnosis:  


   Primary Impression:  


   Syncope


   Syncope type:  unspecified  Qualified Codes:  R55 - Syncope and collapse


   Additional Impressions:  


   Transient hypotension


   Head injury


   Encounter type:  initial encounter  Qualified Codes:  S09.90XA - Unspecified 


   injury of head, initial encounter


   Laceration of right eyebrow


   Encounter type:  initial encounter  Qualified Codes:  S01.111A - Laceration 


   without foreign body of right eyelid and periocular area, initial encounter


   Subacute subdural hematoma


Condition:  Fair











SHANNAN MOSS MD          Jul 7, 2019 18:26

## 2019-07-08 VITALS — RESPIRATION RATE: 18 BRPM | SYSTOLIC BLOOD PRESSURE: 133 MMHG | DIASTOLIC BLOOD PRESSURE: 85 MMHG | HEART RATE: 67 BPM

## 2019-07-08 VITALS — RESPIRATION RATE: 18 BRPM | DIASTOLIC BLOOD PRESSURE: 89 MMHG | HEART RATE: 72 BPM | SYSTOLIC BLOOD PRESSURE: 129 MMHG

## 2019-07-08 VITALS — HEART RATE: 69 BPM | DIASTOLIC BLOOD PRESSURE: 73 MMHG | SYSTOLIC BLOOD PRESSURE: 133 MMHG | RESPIRATION RATE: 14 BRPM

## 2019-07-08 VITALS — DIASTOLIC BLOOD PRESSURE: 86 MMHG | SYSTOLIC BLOOD PRESSURE: 138 MMHG | RESPIRATION RATE: 18 BRPM | HEART RATE: 66 BPM

## 2019-07-08 VITALS — HEART RATE: 93 BPM | SYSTOLIC BLOOD PRESSURE: 136 MMHG | RESPIRATION RATE: 22 BRPM | DIASTOLIC BLOOD PRESSURE: 81 MMHG

## 2019-07-08 VITALS — RESPIRATION RATE: 17 BRPM | DIASTOLIC BLOOD PRESSURE: 61 MMHG | SYSTOLIC BLOOD PRESSURE: 128 MMHG

## 2019-07-08 VITALS — DIASTOLIC BLOOD PRESSURE: 76 MMHG | SYSTOLIC BLOOD PRESSURE: 124 MMHG | RESPIRATION RATE: 13 BRPM | HEART RATE: 70 BPM

## 2019-07-08 VITALS — RESPIRATION RATE: 15 BRPM | DIASTOLIC BLOOD PRESSURE: 74 MMHG | SYSTOLIC BLOOD PRESSURE: 140 MMHG | HEART RATE: 74 BPM

## 2019-07-08 VITALS — SYSTOLIC BLOOD PRESSURE: 131 MMHG | HEART RATE: 82 BPM | RESPIRATION RATE: 17 BRPM | DIASTOLIC BLOOD PRESSURE: 77 MMHG

## 2019-07-08 VITALS — HEART RATE: 75 BPM | RESPIRATION RATE: 19 BRPM | SYSTOLIC BLOOD PRESSURE: 149 MMHG | DIASTOLIC BLOOD PRESSURE: 71 MMHG

## 2019-07-08 VITALS — SYSTOLIC BLOOD PRESSURE: 128 MMHG | DIASTOLIC BLOOD PRESSURE: 74 MMHG | HEART RATE: 78 BPM | RESPIRATION RATE: 18 BRPM

## 2019-07-08 VITALS — DIASTOLIC BLOOD PRESSURE: 81 MMHG | HEART RATE: 81 BPM | RESPIRATION RATE: 17 BRPM | SYSTOLIC BLOOD PRESSURE: 135 MMHG

## 2019-07-08 VITALS — RESPIRATION RATE: 15 BRPM | SYSTOLIC BLOOD PRESSURE: 109 MMHG | HEART RATE: 86 BPM | DIASTOLIC BLOOD PRESSURE: 64 MMHG

## 2019-07-08 VITALS — DIASTOLIC BLOOD PRESSURE: 89 MMHG | HEART RATE: 82 BPM | RESPIRATION RATE: 16 BRPM | SYSTOLIC BLOOD PRESSURE: 158 MMHG

## 2019-07-08 VITALS — DIASTOLIC BLOOD PRESSURE: 64 MMHG | RESPIRATION RATE: 13 BRPM | SYSTOLIC BLOOD PRESSURE: 116 MMHG | HEART RATE: 62 BPM

## 2019-07-08 VITALS — HEART RATE: 69 BPM | DIASTOLIC BLOOD PRESSURE: 68 MMHG | SYSTOLIC BLOOD PRESSURE: 126 MMHG | RESPIRATION RATE: 11 BRPM

## 2019-07-08 VITALS — SYSTOLIC BLOOD PRESSURE: 152 MMHG | RESPIRATION RATE: 11 BRPM | DIASTOLIC BLOOD PRESSURE: 87 MMHG | HEART RATE: 97 BPM

## 2019-07-08 VITALS — SYSTOLIC BLOOD PRESSURE: 145 MMHG | DIASTOLIC BLOOD PRESSURE: 83 MMHG | RESPIRATION RATE: 19 BRPM | HEART RATE: 72 BPM

## 2019-07-08 VITALS — SYSTOLIC BLOOD PRESSURE: 134 MMHG | HEART RATE: 63 BPM | DIASTOLIC BLOOD PRESSURE: 74 MMHG | RESPIRATION RATE: 12 BRPM

## 2019-07-08 VITALS — RESPIRATION RATE: 15 BRPM | HEART RATE: 68 BPM | DIASTOLIC BLOOD PRESSURE: 75 MMHG | SYSTOLIC BLOOD PRESSURE: 133 MMHG

## 2019-07-08 VITALS — DIASTOLIC BLOOD PRESSURE: 80 MMHG | RESPIRATION RATE: 15 BRPM | HEART RATE: 86 BPM | SYSTOLIC BLOOD PRESSURE: 152 MMHG

## 2019-07-08 VITALS — SYSTOLIC BLOOD PRESSURE: 120 MMHG | RESPIRATION RATE: 20 BRPM | DIASTOLIC BLOOD PRESSURE: 83 MMHG | HEART RATE: 79 BPM

## 2019-07-08 LAB
ADD MAN DIFF?: NO
ANION GAP: 7 (ref 5–13)
BASOPHIL #: 0 10^3/UL (ref 0–0.1)
BASOPHILS %: 0.5 % (ref 0–2)
BLOOD UREA NITROGEN: 9 MG/DL (ref 7–20)
CALCIUM: 8.3 MG/DL (ref 8.4–10.2)
CARBON DIOXIDE: 27 MMOL/L (ref 21–31)
CHLORIDE: 106 MMOL/L (ref 97–110)
CREATININE: 0.59 MG/DL (ref 0.61–1.24)
EOSINOPHILS #: 0 10^3/UL (ref 0–0.5)
EOSINOPHILS %: 0.7 % (ref 0–7)
GLUCOSE: 103 MG/DL (ref 70–220)
HEMATOCRIT: 38.9 % (ref 42–52)
HEMOGLOBIN: 13 G/DL (ref 14–18)
IMMATURE GRANS #M: 0.01 10^3/UL (ref 0–0.03)
IMMATURE GRANS % (M): 0.2 % (ref 0–0.43)
LYMPHOCYTES #: 0.7 10^3/UL (ref 0.8–2.9)
LYMPHOCYTES %: 12.4 % (ref 15–51)
MEAN CORPUSCULAR HEMOGLOBIN: 31 PG (ref 29–33)
MEAN CORPUSCULAR HGB CONC: 33.4 G/DL (ref 32–37)
MEAN CORPUSCULAR VOLUME: 92.6 FL (ref 82–101)
MEAN PLATELET VOLUME: 8.9 FL (ref 7.4–10.4)
MONOCYTE #: 0.6 10^3/UL (ref 0.3–0.9)
MONOCYTES %: 9.6 % (ref 0–11)
NEUTROPHIL #: 4.5 10^3/UL (ref 1.6–7.5)
NEUTROPHILS %: 76.6 % (ref 39–77)
NUCLEATED RED BLOOD CELLS #: 0 10^3/UL (ref 0–0)
NUCLEATED RED BLOOD CELLS%: 0 /100WBC (ref 0–0)
PLATELET COUNT: 209 10^3/UL (ref 140–415)
POTASSIUM: 4.3 MMOL/L (ref 3.5–5.1)
RED BLOOD COUNT: 4.2 10^6/UL (ref 4.7–6.1)
RED CELL DISTRIBUTION WIDTH: 14.2 % (ref 11.5–14.5)
SODIUM: 140 MMOL/L (ref 135–144)
WHITE BLOOD COUNT: 5.8 10^3/UL (ref 4.8–10.8)

## 2019-07-08 NOTE — CONS
Assessment/Plan


Assessment/Plan


Assessment/Plan (Daily)


Diagnosis


- Chronic subdural hematoma





84 year old male with chronic subdural who is doing well neurologically


- transfer floor


- would defer MRI or further imaging


- f/u as outpatient in 2 weeks with CT





Consultation Date/Type/Reason


Admit Date/Time





Date of Consultation:  Jul 8, 2019


Date/Time of Note


DATE: 7/8/19 


TIME: 10:00





Hx of Present Illness


85 yo M with a history of multiple falls while playing tennis (admits to hitting


head), who had a fall yesterday after taking BP medication and hit his head. He 


has a right eyebrow lac. He was found on CT to have a small chronic subdural 


collection. He has no neurologic complaints





Past Medical History


Medical History:  other (See HPI)


Home Meds


Reported Medications


Tamsulosin Hcl* (Flomax*) 0.4 Mg Cap.er.24h, 0.4 MG PO HS, CAP


   7/7/19


Atorvastatin Calcium* (Atorvastatin Calcium*) 20 Mg Tablet, 20 MG PO QHS, #30 


TAB


   7/7/19


Discontinued Reported Medications


Atorvastatin Calcium* (Atorvastatin Calcium*) 20 Mg Tablet, 20 MG PO DAILY, #30 


TAB


   12/29/16


Tamsulosin Hcl* (Tamsulosin Hcl*) 0.4 Mg Cap.er.24h, 0.4 MG PO DAILY, CAP


   12/29/16


Discontinued Scripts


Cephalexin* (Keflex*) 500 Mg Capsule, 500 MG PO Q8, #21 CAP


   Prov:FELA RIOS MD         8/27/17


Medications





Current Medications


Acetaminophen (Tylenol Tab) 650 mg ER BRIDGE PRN PO .MILD PAIN 1-3 OR TEMP;  S


tart 7/7/19 at 21:00;  Stop 7/8/19 at 20:59


Sodium Chloride 1,000 ml @  75 mls/hr [id number] IV  Last administered on 7/7/19at 


22:21; Admin Dose 75 MLS/HR;  Start 7/7/19 at 22:21


Ondansetron HCl (Zofran Inj) 4 mg Q6H  PRN IV NAUSEA AND/OR VOMITING;  Start 


7/7/19 at 22:30


Albuterol/ Ipratropium (Duoneb) 3 ml Q2H RESP THERAPY  PRN NEB SHORTNESS OF 


BREATH;  Start 7/7/19 at 22:30


Phenylephrine HCl 250 ml @  75 mls/hr PER PROTOCOL IV ;  Start 7/7/19 at 22:30


Allergies:  


Coded Allergies:  


     Penicillins (Verified  Allergy, Unknown, 7/8/19)





Past Surgical History


Past Surgical Hx:  other (See HPI)





Social History


Alcohol Use:  other


Smoking Status:  Never smoker


Drug Use:  other





Exam/Review of Systems


Exam


Vitals





Vital Signs


  Date      Temp  Pulse  Resp  B/P (MAP)   Pulse Ox  O2          O2 Flow    FiO2


Time                                                 Delivery    Rate


    7/8/19           82    16      158/89       100  Room Air


     09:00                          (112)


    7/8/19  98.2


     08:00








Intake and Output





7/7/19 7/7/19 7/8/19





1515:00


23:00


07:00





IntakeIntake Total


450 ml





OutputOutput Total


650 ml





BalanceBalance


-200 ml











Exam


Ox3


5/5


no drift





Results


Result Diagram:  


7/8/19 0513                                                                     


          7/8/19 0513





Results 24hrs





Laboratory Tests


 Test
                                7/7/19
18:10  7/7/19
18:13  7/8/19
05:13


 Hemoglobin A1c                              5.4


 White Blood Count                                        6.3  #         5.8


 Red Blood Count                                        4.27  #L       4.20  L


 Hemoglobin                                             13.2  #L       13.0  L


 Hematocrit                                             39.6  #L       38.9  L


 Mean Corpuscular Volume                                  92.7          92.6


 Mean Corpuscular Hemoglobin                              30.9          31.0


 Mean Corpuscular Hemoglobin
Concent  
                  33.3  
       33.4  



 Red Cell Distribution Width                             14.2  #        14.2


 Platelet Count                                            198           209


 Mean Platelet Volume                                      9.1           8.9


 Immature Granulocytes %                                 0.300         0.200


 Neutrophils %                                           85.2  H        76.6


 Lymphocytes %                                            9.6  L       12.4  L


 Monocytes %                                               4.2           9.6


 Eosinophils %                                             0.2           0.7


 Basophils %                                               0.5           0.5


 Nucleated Red Blood Cells %                               0.0           0.0


 Immature Granulocytes #                                 0.020         0.010


 Neutrophils #                                             5.3           4.5


 Lymphocytes #                                            0.6  L        0.7  L


 Monocytes #                                               0.3           0.6


 Eosinophils #                                             0.0           0.0


 Basophils #                                               0.0           0.0


 Nucleated Red Blood Cells #                               0.0           0.0


 Prothrombin Time                                         12.4


 Prothrombin Time Ratio                                    1.0


 INR International Normalized
Ratio   
                  0.91  
  



 Activated Partial
Thromboplast Time  
                 22.9  L
  



 Sodium Level                                              139           140


 Potassium Level                                           4.0           4.3


 Chloride Level                                            104           106


 Carbon Dioxide Level                                       22            27


 Anion Gap                                                  13             7


 Blood Urea Nitrogen                                         9             9


 Creatinine                                               0.62         0.59  L


 Est Glomerular Filtrat Rate
mL/min   
               
             



 Glucose Level                                             131           103


 Calcium Level                                             8.8          8.3  L


 Troponin I                                         < 0.012








Imaging


Imaging


Thin left parietal subdural hematoma - chronic in nature. No mass effect or 


shift





Medications


Medication





Current Medications


Acetaminophen (Tylenol Tab) 650 mg ER BRIDGE PRN PO .MILD PAIN 1-3 OR TEMP;  


Start 7/7/19 at 21:00;  Stop 7/8/19 at 20:59


Sodium Chloride 1,000 ml @  75 mls/hr [id number] IV  Last administered on 7/7/19at 


22:21; Admin Dose 75 MLS/HR;  Start 7/7/19 at 22:21


Ondansetron HCl (Zofran Inj) 4 mg Q6H  PRN IV NAUSEA AND/OR VOMITING;  Start 


7/7/19 at 22:30


Albuterol/ Ipratropium (Duoneb) 3 ml Q2H RESP THERAPY  PRN NEB SHORTNESS OF B


REATH;  Start 7/7/19 at 22:30


Phenylephrine HCl 250 ml @  75 mls/hr PER PROTOCOL IV ;  Start 7/7/19 at 22:30











AMARI DAVIS MD                 Jul 8, 2019 10:04

## 2019-07-08 NOTE — PDOCDIS
Discharge Instructions


CONDITION


                [id number]
Patient Condition:  [id number]
Stable








FOLLOW UP/APPOINTMENTS


Follow-up Plan


See your primary care doctor in the next week


You should have a repeat CT scan of your brain in the next 2 weeks.  You can 


arrange this through the office of Dr Hawkins.  His office number is [phone]


Return to the hospital if you have any concerning symptoms











NOAM BARNES MD           Jul 8, 2019 13:28

## 2019-07-08 NOTE — DS
Date/Time of Note


Date/Time of Note


DATE: 7/8/19 


TIME: 17:07





Discharge Summary


Admission/Discharge Info


Admit Date/Time


Jul 7, 2019 at 22:20


Discharge Date/Time





Discharge Diagnosis


Subdural hematoma


Patient Condition:  Stable


Hospital Course


Head imaging showed a chronic subdural hematoma.  He was seen by neurosurgeon 


who recommended repeat CT in two weeks.  He has no symptoms at all.  no headche.


 Ambulating without difficulty.  He requested to be discharged home and home 


health was arranged


Home Meds


Reported Medications


Tamsulosin Hcl* (Flomax*) 0.4 Mg Cap.er.24h, 0.4 MG PO HS, CAP


   7/7/19


Atorvastatin Calcium* (Atorvastatin Calcium*) 20 Mg Tablet, 20 MG PO QHS, #30 


TAB


   7/7/19


Discontinued Reported Medications


Atorvastatin Calcium* (Atorvastatin Calcium*) 20 Mg Tablet, 20 MG PO DAILY, #30 


TAB


   12/29/16


Tamsulosin Hcl* (Tamsulosin Hcl*) 0.4 Mg Cap.er.24h, 0.4 MG PO DAILY, CAP


   12/29/16


Discontinued Scripts


Cephalexin* (Keflex*) 500 Mg Capsule, 500 MG PO Q8, #21 CAP


   Prov:FELA RIOS MD         8/27/17


Follow-up Plan


See your primary care doctor in the next week


You should have a repeat CT scan of your brain in the next 2 weeks.  You can 


arrange this through the office of Dr Hawkins.  His office number is [phone]


Return to the hospital if you have any concerning symptoms


Primary Care Provider


Not On Staff Doctor


Pending Labs





Laboratory Tests


Test
                   7/7/19
18:10          7/7/19
18:13          7/8/19
05:13


Hemoglobin A1c        5.4 % (0-5.9)


White Blood Count
    
                                6.3                   5.8


                                        10^3/ul
(4.8-10.8)    10^3/ul
(4.8-10.8)


Red Blood Count
      
                               4.27                  4.20


                                       10^6/ul
(4.70-6.10)   10^6/ul
(4.70-6.10)


Hemoglobin
           
                               13.2                  13.0


                                          g/dl
(14.0-18.0)      g/dl
(14.0-18.0)


Hematocrit
           
                 39.6 %
(42.0-52.0)    38.9 %
(42.0-52.0)


Mean Corpuscular      
               92.7 fl
(82.0-101.0)  92.6 fl
(82.0-101.0)


Volume



Mean Corpuscular      
                30.9 pg
(29.0-33.0)   31.0 pg
(29.0-33.0)


Hemoglobin



Mean Corpuscular      
                               33.3                  33.4


Hemoglobin
Concent                        g/dl
(32.0-37.0)      g/dl
(32.0-37.0)


Red Cell              
                 14.2 %
(11.5-14.5)    14.2 %
(11.5-14.5)


Distribution Width



Platelet Count
       
                                198                   209


                                         10^3/UL
(140-415)     10^3/UL
(140-415)


Mean Platelet         
                  9.1 fl
(7.4-10.4)     8.9 fl
(7.4-10.4)


Volume



Immature              
                              0.300                 0.200


Granulocytes %
                            %
(0.001-0.429)       %
(0.001-0.429)


Neutrophils %
        
                 85.2 %
(39.0-77.0)    76.6 %
(39.0-77.0)


Lymphocytes %
        
                  9.6 %
(15.0-51.0)    12.4 %
(15.0-51.0)


Monocytes %
          
                   4.2 %
(0.0-11.0)      9.6 %
(0.0-11.0)


Eosinophils %
        
                    0.2 %
(0.0-7.0)       0.7 %
(0.0-7.0)


Basophils %
          
                    0.5 %
(0.0-2.0)       0.5 %
(0.0-2.0)


Nucleated Red Blood   
                                0.0                   0.0


Cells %
                                 /100WBC
(0.0-0.0)     /100WBC
(0.0-0.0)


Immature              
                              0.020                 0.010


Granulocytes #
                        10^3/ul
(0.0-0.031)   10^3/ul
(0.0-0.031)


Neutrophils #
        
                                5.3                   4.5


                                         10^3/ul
(1.6-7.5)     10^3/ul
(1.6-7.5)


Lymphocytes #
        
                                0.6                   0.7


                                         10^3/ul
(0.8-2.9)     10^3/ul
(0.8-2.9)


Monocytes #
          
                                0.3                   0.6


                                         10^3/ul
(0.3-0.9)     10^3/ul
(0.3-0.9)


Eosinophils #
        
                                0.0                   0.0


                                         10^3/ul
(0.0-0.5)     10^3/ul
(0.0-0.5)


Basophils #
          
                                0.0                   0.0


                                         10^3/ul
(0.0-0.1)     10^3/ul
(0.0-0.1)


Nucleated Red Blood   
                                0.0                   0.0


Cells #
                                 10^3/ul
(0.0-0.0)     10^3/ul
(0.0-0.0)


Prothrombin Time
     
               12.4 Sec
(11.9-14.9)  



Prothrombin Time                                      1.0


Ratio


INR International     
                             0.91 
  



Normalized
Ratio


Activated             
               22.9 Sec
(23.0-35.0)  



Partial
Thromboplast


Time


Sodium Level
         
               139 mmol/L
(135-144)  140 mmol/L
(135-144)


Potassium Level
      
               4.0 mmol/L
(3.5-5.1)  4.3 mmol/L
(3.5-5.1)


Chloride Level
       
                104 mmol/L
()   106 mmol/L
()


Carbon Dioxide        
                  22 mmol/L
(21-31)     27 mmol/L
(21-31)


Level



Anion Gap                                       13 (5-13)              7 (5-13)


Blood Urea Nitrogen                        9 mg/dl (7-20)        9 mg/dl (7-20)


Creatinine
           
                               0.62                  0.59


                                         mg/dl
(0.61-1.24)     mg/dl
(0.61-1.24)


Est Glomerular        
                mL/min (>60) 
        mL/min (>60) 



Filtrat Rate
mL/min


Glucose Level
        
                 131 mg/dl
()    103 mg/dl
()


Calcium Level
        
               8.8 mg/dl
(8.4-10.2)  8.3 mg/dl
(8.4-10.2)


Troponin I
           
               < 0.012               



                                      ng/ml
(0.000-0.120)














NOAM BARNES MD           Jul 8, 2019 17:08

## 2019-08-08 ENCOUNTER — HOSPITAL ENCOUNTER (EMERGENCY)
Dept: HOSPITAL 54 - ER | Age: 84
Discharge: HOME | End: 2019-08-08
Payer: SELF-PAY

## 2019-08-08 VITALS — SYSTOLIC BLOOD PRESSURE: 140 MMHG | DIASTOLIC BLOOD PRESSURE: 75 MMHG

## 2019-08-08 VITALS — HEIGHT: 68 IN | WEIGHT: 135 LBS | BODY MASS INDEX: 20.46 KG/M2

## 2019-08-08 DIAGNOSIS — R55: ICD-10-CM
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
## 2019-08-08 DIAGNOSIS — S01.01XA: Primary | ICD-10-CM
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
## 2019-08-08 DIAGNOSIS — Z88.0: ICD-10-CM

## 2019-08-08 DIAGNOSIS — I10: ICD-10-CM

## 2019-08-08 DIAGNOSIS — Z85.818: ICD-10-CM
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
## 2019-08-08 LAB
BASOPHILS # BLD AUTO: 0 /CMM (ref 0–0.2)
BASOPHILS NFR BLD AUTO: 0.6 % (ref 0–2)
BUN SERPL-MCNC: 11 MG/DL (ref 7–18)
CALCIUM SERPL-MCNC: 8.7 MG/DL (ref 8.5–10.1)
CHLORIDE SERPL-SCNC: 102 MMOL/L (ref 98–107)
CO2 SERPL-SCNC: 33 MMOL/L (ref 21–32)
CREAT SERPL-MCNC: 0.7 MG/DL (ref 0.6–1.3)
EOSINOPHIL NFR BLD AUTO: 6.7 % (ref 0–6)
GLUCOSE SERPL-MCNC: 107 MG/DL (ref 74–106)
HCT VFR BLD AUTO: 42 % (ref 39–51)
HGB BLD-MCNC: 13.9 G/DL (ref 13.5–17.5)
LYMPHOCYTES NFR BLD AUTO: 1.9 /CMM (ref 0.8–4.8)
LYMPHOCYTES NFR BLD AUTO: 36 % (ref 20–44)
MCHC RBC AUTO-ENTMCNC: 33 G/DL (ref 31–36)
MCV RBC AUTO: 95 FL (ref 80–96)
MONOCYTES NFR BLD AUTO: 0.5 /CMM (ref 0.1–1.3)
MONOCYTES NFR BLD AUTO: 9.7 % (ref 2–12)
NEUTROPHILS # BLD AUTO: 2.5 /CMM (ref 1.8–8.9)
NEUTROPHILS NFR BLD AUTO: 47 % (ref 43–81)
PLATELET # BLD AUTO: 207 /CMM (ref 150–450)
POTASSIUM SERPL-SCNC: 3.9 MMOL/L (ref 3.5–5.1)
RBC # BLD AUTO: 4.37 MIL/UL (ref 4.5–6)
SODIUM SERPL-SCNC: 141 MMOL/L (ref 136–145)
WBC NRBC COR # BLD AUTO: 5.3 K/UL (ref 4.3–11)

## 2019-08-08 PROCEDURE — 80048 BASIC METABOLIC PNL TOTAL CA: CPT

## 2019-08-08 PROCEDURE — 96360 HYDRATION IV INFUSION INIT: CPT

## 2019-08-08 PROCEDURE — 93005 ELECTROCARDIOGRAM TRACING: CPT

## 2019-08-08 PROCEDURE — 85025 COMPLETE CBC W/AUTO DIFF WBC: CPT

## 2019-08-08 PROCEDURE — 99284 EMERGENCY DEPT VISIT MOD MDM: CPT

## 2019-08-08 PROCEDURE — 84484 ASSAY OF TROPONIN QUANT: CPT

## 2019-08-08 PROCEDURE — 36415 COLL VENOUS BLD VENIPUNCTURE: CPT

## 2019-08-08 PROCEDURE — 71045 X-RAY EXAM CHEST 1 VIEW: CPT

## 2019-08-08 PROCEDURE — 70450 CT HEAD/BRAIN W/O DYE: CPT

## 2019-08-08 NOTE — NUR
"BIBRA60 FOR SYNCOPAL EPISODE WHILE ATTENDING A MEETING, +HEAD LACPER REPORT, 
FELL OFF HIS CHAIR.  PTA" PT AAOX4, -SOB, NAD NOTED, VSS, PENDING MD BAUER

## 2020-02-20 ENCOUNTER — HOSPITAL ENCOUNTER (EMERGENCY)
Dept: HOSPITAL 54 - ER | Age: 85
Discharge: NURSING, REHAB OR LONG-TERM CARE | End: 2020-02-20
Payer: COMMERCIAL

## 2020-02-20 VITALS — BODY MASS INDEX: 21.22 KG/M2 | HEIGHT: 68 IN | WEIGHT: 140 LBS

## 2020-02-20 VITALS — DIASTOLIC BLOOD PRESSURE: 64 MMHG | SYSTOLIC BLOOD PRESSURE: 158 MMHG

## 2020-02-20 DIAGNOSIS — Z88.0: ICD-10-CM

## 2020-02-20 DIAGNOSIS — I44.0: ICD-10-CM

## 2020-02-20 DIAGNOSIS — Z85.819: ICD-10-CM
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
## 2020-02-20 DIAGNOSIS — S82.092A: Primary | ICD-10-CM
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
## 2020-02-20 DIAGNOSIS — R00.1: ICD-10-CM

## 2020-02-20 DIAGNOSIS — I10: ICD-10-CM

## 2020-02-20 LAB
BASOPHILS # BLD AUTO: 0 /CMM (ref 0–0.2)
BASOPHILS NFR BLD AUTO: 0.3 % (ref 0–2)
BUN SERPL-MCNC: 12 MG/DL (ref 7–18)
CALCIUM SERPL-MCNC: 9 MG/DL (ref 8.5–10.1)
CHLORIDE SERPL-SCNC: 102 MMOL/L (ref 98–107)
CO2 SERPL-SCNC: 30 MMOL/L (ref 21–32)
CREAT SERPL-MCNC: 0.8 MG/DL (ref 0.6–1.3)
EOSINOPHIL NFR BLD AUTO: 0.3 % (ref 0–6)
GLUCOSE SERPL-MCNC: 147 MG/DL (ref 74–106)
HCT VFR BLD AUTO: 43 % (ref 39–51)
HGB BLD-MCNC: 14.3 G/DL (ref 13.5–17.5)
KETONES UR STRIP-MCNC: 40 MG/DL
LYMPHOCYTES NFR BLD AUTO: 0.7 /CMM (ref 0.8–4.8)
LYMPHOCYTES NFR BLD AUTO: 8.7 % (ref 20–44)
MCHC RBC AUTO-ENTMCNC: 33 G/DL (ref 31–36)
MCV RBC AUTO: 95 FL (ref 80–96)
MONOCYTES NFR BLD AUTO: 0.7 /CMM (ref 0.1–1.3)
MONOCYTES NFR BLD AUTO: 8.6 % (ref 2–12)
NEUTROPHILS # BLD AUTO: 6.5 /CMM (ref 1.8–8.9)
NEUTROPHILS NFR BLD AUTO: 82.1 % (ref 43–81)
PH UR STRIP: 7 [PH] (ref 5–8)
PLATELET # BLD AUTO: 224 /CMM (ref 150–450)
POTASSIUM SERPL-SCNC: 3.5 MMOL/L (ref 3.5–5.1)
RBC # BLD AUTO: 4.54 MIL/UL (ref 4.5–6)
RBC #/AREA URNS HPF: (no result) /HPF (ref 0–2)
SODIUM SERPL-SCNC: 139 MMOL/L (ref 136–145)
UROBILINOGEN UR STRIP-MCNC: 0.2 EU/DL
WBC #/AREA URNS HPF: (no result) /HPF (ref 0–3)
WBC NRBC COR # BLD AUTO: 7.9 K/UL (ref 4.3–11)

## 2020-02-20 NOTE — NUR
CALL FROM [facility] MD, SPOKE WITH DR ORTIZ,BOTH AGREED TO TRANSFER PATIENT 
TO CONTRACTED HOSPITAL.

## 2020-02-20 NOTE — NUR
PT [id number] C/O L KNEE PAIN AND SWELLING S/P GLF DENIES HEAD INJURY AND KO, PT 
IS AAOX4, NOT IN RESPIRATORY DISTRESS, HOOKED TO MONITOR, KEPT RESTED AND 
COMFORTABLE, WILL CONTINUE TO MONITOR.

## 2021-04-16 ENCOUNTER — HOSPITAL ENCOUNTER (INPATIENT)
Dept: HOSPITAL 54 - ER | Age: 86
LOS: 4 days | Discharge: NURSING, REHAB OR LONG-TERM CARE | DRG: 536 | End: 2021-04-20
Attending: NURSE PRACTITIONER | Admitting: NURSE PRACTITIONER
Payer: COMMERCIAL

## 2021-04-16 VITALS — BODY MASS INDEX: 20.88 KG/M2 | HEIGHT: 67 IN | WEIGHT: 133 LBS

## 2021-04-16 VITALS — SYSTOLIC BLOOD PRESSURE: 136 MMHG | DIASTOLIC BLOOD PRESSURE: 78 MMHG

## 2021-04-16 VITALS — DIASTOLIC BLOOD PRESSURE: 96 MMHG | SYSTOLIC BLOOD PRESSURE: 163 MMHG
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
## 2021-04-16 DIAGNOSIS — N40.0: ICD-10-CM

## 2021-04-16 DIAGNOSIS — Z85.819: ICD-10-CM
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
## 2021-04-16 DIAGNOSIS — D68.59: ICD-10-CM

## 2021-04-16 DIAGNOSIS — I70.8: ICD-10-CM

## 2021-04-16 DIAGNOSIS — M85.80: ICD-10-CM

## 2021-04-16 DIAGNOSIS — E11.9: ICD-10-CM

## 2021-04-16 DIAGNOSIS — I10: ICD-10-CM

## 2021-04-16 DIAGNOSIS — S72.111A: Primary | ICD-10-CM

## 2021-04-16 DIAGNOSIS — Z74.09: ICD-10-CM

## 2021-04-16 DIAGNOSIS — Z92.3: ICD-10-CM

## 2021-04-16 DIAGNOSIS — E80.7: ICD-10-CM

## 2021-04-16 DIAGNOSIS — Z88.0: ICD-10-CM

## 2021-04-16 DIAGNOSIS — M19.90: ICD-10-CM

## 2021-04-16 LAB
ALBUMIN SERPL BCP-MCNC: 3.7 G/DL (ref 3.4–5)
ALP SERPL-CCNC: 83 U/L (ref 46–116)
ALT SERPL W P-5'-P-CCNC: 22 U/L (ref 12–78)
AST SERPL W P-5'-P-CCNC: 29 U/L (ref 15–37)
BASOPHILS # BLD AUTO: 0.1 /CMM (ref 0–0.2)
BASOPHILS NFR BLD AUTO: 0.7 % (ref 0–2)
BILIRUB DIRECT SERPL-MCNC: 0.3 MG/DL (ref 0–0.2)
BILIRUB SERPL-MCNC: 1.9 MG/DL (ref 0.2–1)
BUN SERPL-MCNC: 13 MG/DL (ref 7–18)
CALCIUM SERPL-MCNC: 9 MG/DL (ref 8.5–10.1)
CHLORIDE SERPL-SCNC: 104 MMOL/L (ref 98–107)
CO2 SERPL-SCNC: 28 MMOL/L (ref 21–32)
CREAT SERPL-MCNC: 0.8 MG/DL (ref 0.6–1.3)
EOSINOPHIL NFR BLD AUTO: 0.4 % (ref 0–6)
GLUCOSE SERPL-MCNC: 121 MG/DL (ref 74–106)
HCT VFR BLD AUTO: 43 % (ref 39–51)
HGB BLD-MCNC: 14 G/DL (ref 13.5–17.5)
LYMPHOCYTES NFR BLD AUTO: 0.5 /CMM (ref 0.8–4.8)
LYMPHOCYTES NFR BLD AUTO: 6.4 % (ref 20–44)
MCHC RBC AUTO-ENTMCNC: 33 G/DL (ref 31–36)
MCV RBC AUTO: 97 FL (ref 80–96)
MONOCYTES NFR BLD AUTO: 0.5 /CMM (ref 0.1–1.3)
MONOCYTES NFR BLD AUTO: 6.1 % (ref 2–12)
NEUTROPHILS # BLD AUTO: 7.3 /CMM (ref 1.8–8.9)
NEUTROPHILS NFR BLD AUTO: 86.4 % (ref 43–81)
PLATELET # BLD AUTO: 195 /CMM (ref 150–450)
POTASSIUM SERPL-SCNC: 4.3 MMOL/L (ref 3.5–5.1)
PROT SERPL-MCNC: 7.9 G/DL (ref 6.4–8.2)
RBC # BLD AUTO: 4.43 MIL/UL (ref 4.5–6)
SODIUM SERPL-SCNC: 140 MMOL/L (ref 136–145)
WBC NRBC COR # BLD AUTO: 8.4 K/UL (ref 4.3–11)

## 2021-04-16 PROCEDURE — U0003 INFECTIOUS AGENT DETECTION BY NUCLEIC ACID (DNA OR RNA); SEVERE ACUTE RESPIRATORY SYNDROME CORONAVIRUS 2 (SARS-COV-2) (CORONAVIRUS DISEASE [COVID-19]), AMPLIFIED PROBE TECHNIQUE, MAKING USE OF HIGH THROUGHPUT TECHNOLOGIES AS DESCRIBED BY CMS-2020-01-R: HCPCS

## 2021-04-16 PROCEDURE — G0378 HOSPITAL OBSERVATION PER HR: HCPCS

## 2021-04-16 PROCEDURE — C9803 HOPD COVID-19 SPEC COLLECT: HCPCS

## 2021-04-16 RX ADMIN — ENOXAPARIN SODIUM SCH MG: 40 INJECTION SUBCUTANEOUS at 20:35

## 2021-04-16 RX ADMIN — Medication PRN TAB: at 18:23

## 2021-04-16 NOTE — NUR
The patient is bibra60, c/o right hip pain s/p tripped and fall, 
-ko,-deformity. The patient rates pain 8/10. Denies any numbness/tingling in 
the extremity. In room air and denies SOB. Respiration regular and unlabored. 
Warm blanket provided for comfort. Will continue to monitor the patient.

## 2021-04-16 NOTE — NUR
RN CLOSING NOTE



PATIENT ADMITTED FROM ER, ADMIT DX IS OSTEOPENIA. PATIENT IN BED, [place], REMAINS AO X 4, ABLE 
TO RESPONDS ALL STIMULI. SKIN IS WARM TO TOUCH, KEEP CLEAN/DRY, REFUSED SKIN CHECK DUE TO 
RIGHT HIP PAIN. RESPIRATORY EVEN AND UNLABORED ON ROOM AIR. KEPT ELEVATED HOB FOR ENSURE 
AIRWAY AND ASPIRATION PRECAUTION ALSO LOWEST BED POSITION FOR SAFETY. WILL ENDORSE NIGHT 
SHIFT.

## 2021-04-16 NOTE — NUR
MS RN OPENING NOTES: RECEIVED PATIENT IN BED, AWAKE. [place]. NO S/S OF DISTRESS NOTED. NO 
COMPLAIN OF PAIN. CALL LIGHT WITHIN REACH. BED ALARM ON. BED IN LOWEST AND LOCKED POSITION. 
DVT PUMPS REFUSED.

## 2021-04-17 VITALS — SYSTOLIC BLOOD PRESSURE: 164 MMHG | DIASTOLIC BLOOD PRESSURE: 111 MMHG

## 2021-04-17 VITALS — SYSTOLIC BLOOD PRESSURE: 121 MMHG | DIASTOLIC BLOOD PRESSURE: 75 MMHG

## 2021-04-17 VITALS — SYSTOLIC BLOOD PRESSURE: 123 MMHG | DIASTOLIC BLOOD PRESSURE: 74 MMHG

## 2021-04-17 LAB
BASOPHILS # BLD AUTO: 0 /CMM (ref 0–0.2)
BASOPHILS NFR BLD AUTO: 0.8 % (ref 0–2)
BUN SERPL-MCNC: 13 MG/DL (ref 7–18)
CALCIUM SERPL-MCNC: 8.5 MG/DL (ref 8.5–10.1)
CHLORIDE SERPL-SCNC: 99 MMOL/L (ref 98–107)
CHOLEST SERPL-MCNC: 214 MG/DL (ref ?–200)
CO2 SERPL-SCNC: 30 MMOL/L (ref 21–32)
CREAT SERPL-MCNC: 0.8 MG/DL (ref 0.6–1.3)
EOSINOPHIL NFR BLD AUTO: 1.2 % (ref 0–6)
GLUCOSE SERPL-MCNC: 117 MG/DL (ref 74–106)
HCT VFR BLD AUTO: 40 % (ref 39–51)
HDLC SERPL-MCNC: 60 MG/DL (ref 40–60)
HGB BLD-MCNC: 13.3 G/DL (ref 13.5–17.5)
LDLC SERPL DIRECT ASSAY-MCNC: 141 MG/DL (ref 0–99)
LYMPHOCYTES NFR BLD AUTO: 0.7 /CMM (ref 0.8–4.8)
LYMPHOCYTES NFR BLD AUTO: 12.3 % (ref 20–44)
MAGNESIUM SERPL-MCNC: 1.9 MG/DL (ref 1.8–2.4)
MCHC RBC AUTO-ENTMCNC: 33 G/DL (ref 31–36)
MCV RBC AUTO: 95 FL (ref 80–96)
MONOCYTES NFR BLD AUTO: 0.8 /CMM (ref 0.1–1.3)
MONOCYTES NFR BLD AUTO: 13.1 % (ref 2–12)
NEUTROPHILS # BLD AUTO: 4.3 /CMM (ref 1.8–8.9)
NEUTROPHILS NFR BLD AUTO: 72.6 % (ref 43–81)
PHOSPHATE SERPL-MCNC: 2.8 MG/DL (ref 2.5–4.9)
PLATELET # BLD AUTO: 178 /CMM (ref 150–450)
POTASSIUM SERPL-SCNC: 4 MMOL/L (ref 3.5–5.1)
RBC # BLD AUTO: 4.19 MIL/UL (ref 4.5–6)
SODIUM SERPL-SCNC: 135 MMOL/L (ref 136–145)
TRIGL SERPL-MCNC: 71 MG/DL (ref 30–150)
WBC NRBC COR # BLD AUTO: 6 K/UL (ref 4.3–11)

## 2021-04-17 RX ADMIN — ENOXAPARIN SODIUM SCH MG: 40 INJECTION SUBCUTANEOUS at 21:34

## 2021-04-17 RX ADMIN — Medication PRN TAB: at 04:10

## 2021-04-17 RX ADMIN — VALSARTAN SCH MG: 80 TABLET ORAL at 08:58

## 2021-04-17 NOTE — NUR
MS/RN  NOTE



RECEIVED REPORT FROM NIGHT SHIFT NURSE.

PATIENT SEEN LAYING IN HOSPITAL BED. A/O X4 HARD OF HEARING, NO ACUTE DISTRESS NOTED. 
PATIENT ON ROOM AIR, TOLERATING WELL, NO SOB NOTED, BREATHING EVEN NON LABORED. ALL SAFETY 
MEASURES IN PLACE, BED LOCKED AND IN LOWEST POSITION, CALL LIGHT WITHIN REACH. WILL CONTINUE 
TO MONITOR AND ENSURE SAFETY.

## 2021-04-17 NOTE — NUR
MS/RN  CLOSING NOTE



PATIENT SEEN LAYING IN HOSPITAL BED. A/O X4 HARD OF HEARING, NO ACUTE DISTRESS NOTED. 
PATIENT ON ROOM AIR, TOLERATING WELL, NO SOB NOTED, BREATHING EVEN NON LABORED. ALL SAFETY 
MEASURES IN PLACE, BED LOCKED AND IN LOWEST POSITION, CALL LIGHT WITHIN REACH. ALL NEEDS 
METT THROUGHOUT THE SHIFT. WILL ENDORSE TO NIGHT SHIFT NURSE.

## 2021-04-17 NOTE — NUR
MS RN OPENING NOTES: RECEIVED PATIENT IN BED, AWAKE, A/O X4, NO S/S OF DISTRESS NOTED. CALL 
LIGHT WITHIN REACH. BED ALARM ON. BED IN LOWEST AND LOCKED POSITION.

## 2021-04-18 VITALS — SYSTOLIC BLOOD PRESSURE: 100 MMHG | DIASTOLIC BLOOD PRESSURE: 62 MMHG

## 2021-04-18 VITALS — SYSTOLIC BLOOD PRESSURE: 97 MMHG | DIASTOLIC BLOOD PRESSURE: 60 MMHG

## 2021-04-18 VITALS — DIASTOLIC BLOOD PRESSURE: 90 MMHG | SYSTOLIC BLOOD PRESSURE: 131 MMHG

## 2021-04-18 LAB
BASOPHILS # BLD AUTO: 0 /CMM (ref 0–0.2)
BASOPHILS NFR BLD AUTO: 0.3 % (ref 0–2)
BUN SERPL-MCNC: 11 MG/DL (ref 7–18)
CALCIUM SERPL-MCNC: 8.3 MG/DL (ref 8.5–10.1)
CHLORIDE SERPL-SCNC: 102 MMOL/L (ref 98–107)
CO2 SERPL-SCNC: 26 MMOL/L (ref 21–32)
CREAT SERPL-MCNC: 0.7 MG/DL (ref 0.6–1.3)
EOSINOPHIL NFR BLD AUTO: 1.5 % (ref 0–6)
GLUCOSE SERPL-MCNC: 119 MG/DL (ref 74–106)
HCT VFR BLD AUTO: 41 % (ref 39–51)
HGB BLD-MCNC: 13.6 G/DL (ref 13.5–17.5)
LYMPHOCYTES NFR BLD AUTO: 0.8 /CMM (ref 0.8–4.8)
LYMPHOCYTES NFR BLD AUTO: 12.6 % (ref 20–44)
MAGNESIUM SERPL-MCNC: 1.7 MG/DL (ref 1.8–2.4)
MCHC RBC AUTO-ENTMCNC: 33 G/DL (ref 31–36)
MCV RBC AUTO: 96 FL (ref 80–96)
MONOCYTES NFR BLD AUTO: 0.6 /CMM (ref 0.1–1.3)
MONOCYTES NFR BLD AUTO: 10.8 % (ref 2–12)
NEUTROPHILS # BLD AUTO: 4.5 /CMM (ref 1.8–8.9)
NEUTROPHILS NFR BLD AUTO: 74.8 % (ref 43–81)
PLATELET # BLD AUTO: 183 /CMM (ref 150–450)
POTASSIUM SERPL-SCNC: 4 MMOL/L (ref 3.5–5.1)
RBC # BLD AUTO: 4.32 MIL/UL (ref 4.5–6)
SODIUM SERPL-SCNC: 136 MMOL/L (ref 136–145)
WBC NRBC COR # BLD AUTO: 6 K/UL (ref 4.3–11)

## 2021-04-18 RX ADMIN — MAGNESIUM SULFATE IN DEXTROSE SCH MLS/HR: 10 INJECTION, SOLUTION INTRAVENOUS at 12:06

## 2021-04-18 RX ADMIN — ENOXAPARIN SODIUM SCH MG: 40 INJECTION SUBCUTANEOUS at 20:25

## 2021-04-18 RX ADMIN — VALSARTAN SCH MG: 80 TABLET ORAL at 09:50

## 2021-04-18 RX ADMIN — MAGNESIUM SULFATE IN DEXTROSE SCH MLS/HR: 10 INJECTION, SOLUTION INTRAVENOUS at 09:51

## 2021-04-18 RX ADMIN — Medication SCH ML: at 17:36

## 2021-04-18 RX ADMIN — Medication PRN TAB: at 04:34

## 2021-04-18 NOTE — NUR
MS/RN OPENING NOTE



RECEIVED PATIENT RESTING IN BED. AWAKE, ALERT AND ORIENTED X 3. ABLE TO MAKE NEEDS KNOWN. NO 
COMPLAINTS OF PAIN AT THIS TIME. RESPIRATIONS EVEN AND UNLABORED. IV ACCESS TO LEFT AC 
INTACT, PATENT AND SALINE LOCKED. PATIENT IS WBAT TO RLE WITH NO ABDUCTION OF RIGHT HIP.  
CALL LIGHT WITHIN REACH. ASPIRATION, FALL AND SAFETY PRECAUTIONS MAINTAINED. WILL CONTINUE 
TO MONITOR.

## 2021-04-18 NOTE — NUR
MS RN CLOSING NOTES: PATIENT IN BED, AWAKE, A/O X4. NO S/S OF DISTRESS NOTED. CALL LIGHT 
WITHIN REACH. BED ALARM ON. BED IN LOWEST AND LOCKED POSITION. NO ABDUCTION OF THE RIGHT 
HIP, INSTRUCTED THE PATIENT, VERBALIZED UNDERSTANDING. PATIENT ABLE TO  TURN A LITTLE BIT TO 
THE LEFT SIDE WITH HELP AND WITH THE PILLOW BETWEEN THE LEGS, SKIN ASSESSMENT ON THE BACK 
AND SACRAL AREA DONE, WNL, NO SKIN PROBLEM. PATIENT VOIDED ON THE PAD, CLEANSED PERINEAL 
WITH SOAP AND WATER, KEPT DRY AND PAD CHANGED. PATIENT TOLERATED. WITH DVT PUMPS ON BOTH 
LEGS.

## 2021-04-18 NOTE — NUR
MS RN OPENING NOTES 



PATIENT IN BED EATING BREAKFAST, ALERT AND ORIENTED X 4. NO SIGNS OF DISTRESS OR SHORTNESS 
OF BREATH NOTED. SAFETY MEASURES IN PLACE, CALL LIGHT WITHIN REACH, BED ALARM ON, BED IN 
LOWEST POSITION. INSTRUCTED PATIENT ABOUT NO ABDUCTION OF THE RIGHT HIP, PATIENT VERBALIZED 
UNDERSTANDING. WILL CONTINUE TO MONITOR

## 2021-04-18 NOTE — NUR
MS RN CLOSING NOTES 



PATIENT IN BED, ALERT AND ORIENTED X 4. NO SIGNS OF DISTRESS OR SHORTNESS OF BREATH NOTED. 
SAFETY MEASURES IN PLACE, CALL LIGHT WITHIN REACH, BED ALARM ON, BED IN LOWEST POSITION. 
INSTRUCTED PATIENT ABOUT NO ABDUCTION OF THE RIGHT HIP, PATIENT VERBALIZED UNDERSTANDING. 
WILL ENDORSE TO NIGHT SHIFT NURSE FOR CONTINUITY OF CARE

## 2021-04-19 VITALS — SYSTOLIC BLOOD PRESSURE: 119 MMHG | DIASTOLIC BLOOD PRESSURE: 73 MMHG

## 2021-04-19 VITALS — SYSTOLIC BLOOD PRESSURE: 108 MMHG | DIASTOLIC BLOOD PRESSURE: 60 MMHG

## 2021-04-19 VITALS — SYSTOLIC BLOOD PRESSURE: 118 MMHG | DIASTOLIC BLOOD PRESSURE: 67 MMHG

## 2021-04-19 LAB
BUN SERPL-MCNC: 12 MG/DL (ref 7–18)
CALCIUM SERPL-MCNC: 8.7 MG/DL (ref 8.5–10.1)
CHLORIDE SERPL-SCNC: 99 MMOL/L (ref 98–107)
CO2 SERPL-SCNC: 31 MMOL/L (ref 21–32)
CREAT SERPL-MCNC: 0.7 MG/DL (ref 0.6–1.3)
GLUCOSE SERPL-MCNC: 115 MG/DL (ref 74–106)
MAGNESIUM SERPL-MCNC: 2.2 MG/DL (ref 1.8–2.4)
POTASSIUM SERPL-SCNC: 3.8 MMOL/L (ref 3.5–5.1)
SODIUM SERPL-SCNC: 135 MMOL/L (ref 136–145)

## 2021-04-19 RX ADMIN — VALSARTAN SCH MG: 80 TABLET ORAL at 08:54

## 2021-04-19 RX ADMIN — Medication PRN TAB: at 09:43

## 2021-04-19 RX ADMIN — Medication PRN TAB: at 21:58

## 2021-04-19 RX ADMIN — Medication SCH ML: at 11:32

## 2021-04-19 RX ADMIN — ENOXAPARIN SODIUM SCH MG: 40 INJECTION SUBCUTANEOUS at 22:01

## 2021-04-19 RX ADMIN — Medication PRN TAB: at 04:40

## 2021-04-19 RX ADMIN — Medication SCH ML: at 08:55

## 2021-04-19 RX ADMIN — Medication SCH ML: at 17:08

## 2021-04-19 NOTE — NUR
RN CLOSING NOTE



PATIENT AWAKE IN BED WATCHING TELEVISION. A/O X3 AND ENGLISH SPEAKING. CURRENTLY ON RA WITH 
NO SOB OR RESPIRATORY DISTRESS PRESENT.. NO COMPLAINT OF PAIN OR NAUSEA. NO EDEMA PRESENT. 
HEEL REDNESS PRESENT. ON BEDREST DUE TO R HIP FX. L LE SKIN TEAR PRESENT. HL PRESENT ON L AC 
20G. ROUTINE MEDS GIVEN. SAFETY MEASURES IN PLACE. SIDE RAILS RAISED. BED LOWERED. CALL 
LIGHT WITHIN REACH. REPORT TO BE GIVEN TO NIGHT NURSE FOR PINKY

## 2021-04-19 NOTE — NUR
RN OPENING NOTE



PATIENT AWAKE IN BED WATCHING TELEVISION. A/O X3 AND ENGLISH SPEAKING. CURRENTLY ON RA WITH 
NO SOB OR RESPIRATORY DISTRESS PRESENT.. NO COMPLAINT OF PAIN OR NAUSEA. NO EDEMA PRESENT. 
HEEL REDNESS PRESENT. ON BEDREST DUE TO R HIP FX. L LE SKIN TEAR PRESENT. HL PRESENT ON L AC 
20G. SAFETY MEASURES IN PLACE. SIDE RAILS RAISED. BED LOWERED. CALL LIGHT WITHIN REACH. WILL 
CONTINUE TO MONITOR.

## 2021-04-19 NOTE — NUR
MS/RN NOTE



PATIENT WITH C/O PAIN TO RIGHT HIP, YELLING OUT WHEN TURNING IN BED. GIVEN PRN NORCO WITH 
POSITIVE EFFECT.

## 2021-04-19 NOTE — NUR
WOUND CARE CONSULT: PT PRESENTS WITH CLOSED SKIN TEAR TO LEFT LOWER LEG AND DISCOLORATION TO 
LOWER LEGS, PRESENT ON ADMISSION. PT REFUSED TO TURN FOR FULL SKIN ASSESSMENT AND BACK AND 
BUTTOCKS. PT REPORTS RT HIP PAIN. RN AWARE. RECOMMENDATIONS MADE FOR SKIN PROTECTION. 
DISCUSSED WITH NURSING STAFF. MD IN AGREEMENT WITH PLAN OF CARE. 

-------------------------------------------------------------------------------

Addendum: 04/19/21 at 0937 by ZAID FANG

-------------------------------------------------------------------------------

Amended: Links added.

-------------------------------------------------------------------------------

Addendum: 04/19/21 at 1013 by ZAID CAMPAU

-------------------------------------------------------------------------------

PT ALLOWED SKIN ASSESSMENT OF BACK AND SACRUM AFTER HAVING PAIN MEDICATION. BLANCHABLE 
REDNESS NOTED TO SACRUM. RECOMMENDATIONS MADE FOR SKIN PROTECTION AND DISCUSSED WITH NURSING 
STAFF.

## 2021-04-19 NOTE — NUR
MS/RN CLOSING NOTE



PATIENT CURRENTLY RESTING IN BED. AWAKE, ALERT AND ORIENTED X 3. ABLE TO MAKE NEEDS KNOWN. 
NO COMPLAINTS OF PAIN AT THIS TIME. RESPIRATIONS EVEN AND UNLABORED. IV ACCESS TO LEFT AC 
INTACT, PATENT AND SALINE LOCKED. PATIENT IS WBAT TO RLE WITH NO ABDUCTION OF RIGHT HIP.  
CALL LIGHT WITHIN REACH. ASPIRATION, FALL AND SAFETY PRECAUTIONS MAINTAINED. WILL ENDORSE 
PLAN OF CARE TO ONCOMING SHIFT.

## 2021-04-19 NOTE — NUR
MS RN OPENING NOTES: RECEIVED PATIENT IN BED, AWAKE, A/O X3. NO S/S OF DISTRESS NOTED. CALL 
LIGHT WITHIN REACH. BED ALARM ON. BED IN LOWEST AND LOCKED POSITION. WITH CONDOM CATHETER, 
DRAINING YELLOW COLORED URINE. WITH DVT PUMPS ON BILATERAL LEGS ON.

## 2021-04-20 VITALS — SYSTOLIC BLOOD PRESSURE: 109 MMHG | DIASTOLIC BLOOD PRESSURE: 66 MMHG

## 2021-04-20 VITALS — DIASTOLIC BLOOD PRESSURE: 61 MMHG | SYSTOLIC BLOOD PRESSURE: 110 MMHG

## 2021-04-20 LAB
BASOPHILS # BLD AUTO: 0 /CMM (ref 0–0.2)
BASOPHILS NFR BLD AUTO: 0.7 % (ref 0–2)
BUN SERPL-MCNC: 15 MG/DL (ref 7–18)
CALCIUM SERPL-MCNC: 8.7 MG/DL (ref 8.5–10.1)
CHLORIDE SERPL-SCNC: 100 MMOL/L (ref 98–107)
CO2 SERPL-SCNC: 31 MMOL/L (ref 21–32)
CREAT SERPL-MCNC: 0.7 MG/DL (ref 0.6–1.3)
EOSINOPHIL NFR BLD AUTO: 4.4 % (ref 0–6)
GLUCOSE SERPL-MCNC: 100 MG/DL (ref 74–106)
HCT VFR BLD AUTO: 40 % (ref 39–51)
HGB BLD-MCNC: 13 G/DL (ref 13.5–17.5)
LYMPHOCYTES NFR BLD AUTO: 0.9 /CMM (ref 0.8–4.8)
LYMPHOCYTES NFR BLD AUTO: 19.9 % (ref 20–44)
MAGNESIUM SERPL-MCNC: 1.9 MG/DL (ref 1.8–2.4)
MCHC RBC AUTO-ENTMCNC: 33 G/DL (ref 31–36)
MCV RBC AUTO: 97 FL (ref 80–96)
MONOCYTES NFR BLD AUTO: 0.5 /CMM (ref 0.1–1.3)
MONOCYTES NFR BLD AUTO: 11 % (ref 2–12)
NEUTROPHILS # BLD AUTO: 2.9 /CMM (ref 1.8–8.9)
NEUTROPHILS NFR BLD AUTO: 64 % (ref 43–81)
PLATELET # BLD AUTO: 206 /CMM (ref 150–450)
POTASSIUM SERPL-SCNC: 4 MMOL/L (ref 3.5–5.1)
RBC # BLD AUTO: 4.09 MIL/UL (ref 4.5–6)
SODIUM SERPL-SCNC: 136 MMOL/L (ref 136–145)
WBC NRBC COR # BLD AUTO: 4.5 K/UL (ref 4.3–11)

## 2021-04-20 RX ADMIN — Medication PRN TAB: at 16:58

## 2021-04-20 RX ADMIN — Medication PRN TAB: at 04:56

## 2021-04-20 RX ADMIN — Medication PRN TAB: at 10:28

## 2021-04-20 RX ADMIN — Medication SCH ML: at 12:04

## 2021-04-20 RX ADMIN — Medication SCH ML: at 17:33

## 2021-04-20 RX ADMIN — Medication SCH ML: at 08:14

## 2021-04-20 RX ADMIN — VALSARTAN SCH MG: 80 TABLET ORAL at 08:27

## 2021-04-20 NOTE — NUR
MS/RN  OPENING NOTE



RECEIVED PATIENT FROM NIGHT SHIFT NURSE. 

PATIENT IS SEEN LAYING IN BED. A/O X4 HARD OF HEARING, NO ACUTE DISTRESS NOTED AT THIS TIME. 
PATIENT ON ROOM AIR, TOLERATING WELL, NO SOB NOTED, BREATHING EVEN NON LABORED. SAFETY 
MEASURES IN PLACE, BED LOCKED AND IN LOWEST POSITION, CALL LIGHT WITHIN REACH. WILL CONTINUE 
TO MONITOR AND ENSURE SAFETY.

## 2021-04-20 NOTE — NUR
MS RN DISCHARGE NOTE



PATIENT DISCHARGED TO [facility] VIA AMBULANCE, ACCOMPANIED BY TWO EMT'S. VITALS 
STABLE, A/O X4, NO PAIN OR RESPIRATORY DISTRESS NOTED. BREATHING EVEN AND NON LABORED. 
PATIENT ATE DINNER BEFORE HE LEFT. IV TAKEN OUT, WRISTBAND REMOVED. EXITCARE AND EDUCATION 
MATERIALS GIVEN TO PATIENT.

## 2021-04-20 NOTE — NUR
MS RN CLOSING NOTES: PATIENT IN BED, ASLEEP, NO S/S OF DISTRESS NOTED. CALL LIGHT WITHIN 
REACH. BED ALARM ON. BED IN LOWEST AND LOCKED POSITION. BEDREST. TURNED AND REPOSITIONED 
[id number]. OFFLOADED. NO ABDUCTION OF THE RIGHT HIP DONE. CONDOM CATHETER INTACT.